# Patient Record
Sex: MALE | Race: WHITE
[De-identification: names, ages, dates, MRNs, and addresses within clinical notes are randomized per-mention and may not be internally consistent; named-entity substitution may affect disease eponyms.]

---

## 2018-03-08 ENCOUNTER — HOSPITAL ENCOUNTER (OUTPATIENT)
Dept: HOSPITAL 96 - M.CL | Age: 63
Discharge: HOME | End: 2018-03-08
Attending: INTERNAL MEDICINE
Payer: MEDICARE

## 2018-03-08 VITALS — SYSTOLIC BLOOD PRESSURE: 141 MMHG | DIASTOLIC BLOOD PRESSURE: 84 MMHG

## 2018-03-08 VITALS — BODY MASS INDEX: 27.57 KG/M2 | HEIGHT: 73 IN | WEIGHT: 208 LBS

## 2018-03-08 VITALS — DIASTOLIC BLOOD PRESSURE: 83 MMHG | SYSTOLIC BLOOD PRESSURE: 122 MMHG

## 2018-03-08 VITALS — DIASTOLIC BLOOD PRESSURE: 76 MMHG | SYSTOLIC BLOOD PRESSURE: 123 MMHG

## 2018-03-08 VITALS — DIASTOLIC BLOOD PRESSURE: 76 MMHG | SYSTOLIC BLOOD PRESSURE: 132 MMHG

## 2018-03-08 DIAGNOSIS — Z95.5: ICD-10-CM

## 2018-03-08 DIAGNOSIS — E11.9: ICD-10-CM

## 2018-03-08 DIAGNOSIS — I50.22: ICD-10-CM

## 2018-03-08 DIAGNOSIS — Z79.899: ICD-10-CM

## 2018-03-08 DIAGNOSIS — E78.5: ICD-10-CM

## 2018-03-08 DIAGNOSIS — Z98.890: ICD-10-CM

## 2018-03-08 DIAGNOSIS — Z45.02: Primary | ICD-10-CM

## 2018-03-08 DIAGNOSIS — Z79.4: ICD-10-CM

## 2018-03-08 DIAGNOSIS — G47.33: ICD-10-CM

## 2018-03-08 DIAGNOSIS — Z79.01: ICD-10-CM

## 2018-03-08 DIAGNOSIS — I11.0: ICD-10-CM

## 2018-03-08 DIAGNOSIS — Z95.810: ICD-10-CM

## 2018-03-08 DIAGNOSIS — Z88.8: ICD-10-CM

## 2018-03-08 DIAGNOSIS — I25.5: ICD-10-CM

## 2018-03-08 LAB
ABSOLUTE BASOPHILS: 0.1 THOU/UL (ref 0–0.2)
ABSOLUTE EOSINOPHILS: 0.3 THOU/UL (ref 0–0.7)
ABSOLUTE MONOCYTES: 0.5 THOU/UL (ref 0–1.2)
ANION GAP SERPL CALC-SCNC: 9 MMOL/L (ref 7–16)
APTT BLD: 27.5 SECONDS (ref 25–31.3)
BASOPHILS NFR BLD AUTO: 1.1 %
BUN SERPL-MCNC: 25 MG/DL (ref 7–18)
CALCIUM SERPL-MCNC: 9.2 MG/DL (ref 8.5–10.1)
CHLORIDE SERPL-SCNC: 102 MMOL/L (ref 98–107)
CO2 SERPL-SCNC: 27 MMOL/L (ref 21–32)
CREAT SERPL-MCNC: 0.9 MG/DL (ref 0.6–1.3)
EOSINOPHIL NFR BLD: 2.8 %
GLUCOSE SERPL-MCNC: 189 MG/DL (ref 70–99)
GRANULOCYTES NFR BLD MANUAL: 72.7 %
HCT VFR BLD CALC: 40.4 % (ref 42–52)
HGB BLD-MCNC: 13.6 GM/DL (ref 14–18)
INR PPP: 1.2
LYMPHOCYTES # BLD: 1.8 THOU/UL (ref 0.8–5.3)
LYMPHOCYTES NFR BLD AUTO: 18.1 %
MCH RBC QN AUTO: 28.9 PG (ref 26–34)
MCHC RBC AUTO-ENTMCNC: 33.8 G/DL (ref 28–37)
MCV RBC: 85.5 FL (ref 80–100)
MONOCYTES NFR BLD: 5.3 %
MPV: 7.1 FL. (ref 7.2–11.1)
NEUTROPHILS # BLD: 7.4 THOU/UL (ref 1.6–8.1)
NUCLEATED RBCS: 0 /100WBC
PLATELET COUNT*: 190 THOU/UL (ref 150–400)
POTASSIUM SERPL-SCNC: 4.5 MMOL/L (ref 3.5–5.1)
PROTHROMBIN TIME: 11.4 SECONDS (ref 9.2–11.5)
RBC # BLD AUTO: 4.72 MIL/UL (ref 4.5–6)
RDW-CV: 14.5 % (ref 10.5–14.5)
SODIUM SERPL-SCNC: 138 MMOL/L (ref 136–145)
WBC # BLD AUTO: 10.2 THOU/UL (ref 4–11)

## 2018-03-08 NOTE — EKG
[place] [address]
Phone:  [phone]                     ELECTROCARDIOGRAM REPORT      
_______________________________________________________________________________
 
Name:       JOSEPH RAI                  Room:                      REG CLI 
M.R.#:  [id number]      Account #:      [id number]  
Admission:  18     Attend Phys:    Gonzalez Borrero MD
Discharge:               Date of Birth:  [birth year]  
         Report #: [id number]
    [id number]
_______________________________________________________________________________
THIS REPORT FOR:  //name//                      
 
                          [facility]
                                       
Test Date:    2018               Test Time:    10:30:59
Pat Name:     JOSEPH RAI            Department:   
Patient ID:   [id number]            Room:          
Gender:       M                        Technician:   
:          1955               Requested By: Gonzalez Brorero
Order Number: [id number]    Reading MD:   Gonzalez Borrero
                                 Measurements
Intervals                              Axis          
Rate:         87                       P:            32
LA:           148                      QRS:          202
QRSD:         120                      T:            52
QT:           393                                    
QTc:          473                                    
                           Interpretive Statements
Atrial-sensed ventricular-paced rhythm
No further analysis attempted due to paced rhythm
No previous ECG available for comparison
 
Electronically Signed On 3-8-2018 15:39:10 CST by Gonzalez Borrero
[email or web address]
 
 
 
 
 
 
 
 
 
 
 
 
 
 
 
 
 
 
 
  <ELECTRONICALLY SIGNED>
                                           By: Gonzalez Borrero MD, [facility]   
  18     1539
D: 18 1030   _____________________________________
T: 18 1030   Gonzalez Borrero MD, FACC     /EPI

## 2018-04-05 NOTE — CARD
[address]  [address]                    CARDIAC CATH REPORT           
_______________________________________________________________________________
 
Name:       JOSEPH RAI                  Room:                      REG SHERRELL 
NICOL#:  [id number]      Account #:      [id number]  
Admission:  03/08/18     Attend Phys:    Gonzalez Borrero MD
Discharge:               Date of Birth:  [birth year]  
         Report #: [id number]
                                                                     [id number]
_______________________________________________________________________________
THIS REPORT FOR:  //name//                      
 
 
--------------- APPROVED REPORT --------------
 
 
Study performed:  03/08/2018 12:00:31
 
Patient Status: Out-Patient       Room #: 
Exam: biventricular pacing ICD generator replacement
Indications: biventricular ICD generator at elective replacement
 
The patient is a 62 year-old male with a history of ischemic 
cardiomyopathy status post biventricular ICD placement.
 
Implanted Devices:  Biotronik Itevia 7 HFT DF1 model #114401, 
serial #[id number]
 
Explanted Devices:  Medtronic Protecta XT CRT-D model number D3-1-4 
DRG serial number [id number]
 
Procedure
After informed consent was obtained the patient was brought to the 
cardiac catheterization lab. The area of the left chest was prepped 
and draped in sterile fashion. Local anesthesia was achieved with 1% 
lidocaine. Next after an initial incision was made the existing pulse 
generator was explanted using electrocautery and blunt dissection. 
The generator was removed from the RV LV and atrial leads. The leads 
were checked for adequate sensing and thresholds. The device pocket 
was flushed with antibiotic solution. Next the new biventricular ICD 
pulse generator was attached to the leads. The pulse generator and 
redundant leads were then replaced within the generator pocket. The 
deep tissues were closed using interrupted stitches of 2-0 Vicryl. 
The skin incision was then closed with a single subcuticular stitch 
of 4-0 Vicryl. Several Steri-Strips were placed across the incision. 
A sterile Telfa pad was then covered with a Tegaderm. The patient 
tolerated procedure without complication.
 
Findings
The sensed R-wave was 6.60 mV. The sensed P-wave was 2.0 mV. The 
sensed LV wave was 24.4 mV. The right ventricular pacing threshold 
was 0.9 V at 0.40 ms. The right the LV lead pacing threshold was 0.9 
V at 0.40 ms. The RV pacing impedance was 493 ohms. The atrial pacing 
impedance was 391 ohms. The LV pacing impedance was 594 ohms.
 
VF detection rate to 150 beats per minute. VF therapies anti-tach 
 
 
 
[place] [address]                    CARDIAC CATH REPORT           
_______________________________________________________________________________
 
Name:       JOSEPH RAI                  Room:                      REG CLI 
M.R.#:  [id number]      Account #:      [id number]  
Admission:  03/08/18     Attend Phys:    Gonzalez Borrero MD
Discharge:               Date of Birth:  [birth year]  
         Report #: [id number]
                                                                     [id number]
_______________________________________________________________________________
pacing times one followed by 40 J shocks times a day.
Fast VT detection rate 176 bpm. Fast VT therapies burst pacing 3, 
ramp pacing 3, 40 J shock times a day.
Slow VT detection rate 154 bpm. Slow VT therapy monitor 
only.
 
 
 
Conclusion
1. Biventricular ICD generator at elective replacement.
2. Successful replacement of biventricular ICD generator. 
 
Recommendations
1. Follow-up site check in one week.
 
 
 
 
 
 
 
 
 
 
 
 
 
 
 
 
 
 
 
 
 
 
 
 
 
 
 
 
 
 
<ELECTRONICALLY SIGNED>
                                        By:  Gonzalez Borrero MD, FACC   
04/05/18 1810
D: 04/05/18 1810_______________________________________
T: 04/05/18 1810Michaedano Borrero MD, FACC      /INF

## 2019-03-02 ENCOUNTER — HOSPITAL ENCOUNTER (INPATIENT)
Dept: HOSPITAL 96 - M.TBA | Age: 64
LOS: 3 days | Discharge: HOME HEALTH | DRG: 286 | End: 2019-03-05
Attending: INTERNAL MEDICINE | Admitting: INTERNAL MEDICINE
Payer: MEDICARE

## 2019-03-02 VITALS — DIASTOLIC BLOOD PRESSURE: 61 MMHG | SYSTOLIC BLOOD PRESSURE: 114 MMHG

## 2019-03-02 VITALS — WEIGHT: 198 LBS | HEIGHT: 72.99 IN | BODY MASS INDEX: 26.24 KG/M2

## 2019-03-02 VITALS — DIASTOLIC BLOOD PRESSURE: 64 MMHG | SYSTOLIC BLOOD PRESSURE: 117 MMHG

## 2019-03-02 VITALS — DIASTOLIC BLOOD PRESSURE: 46 MMHG | SYSTOLIC BLOOD PRESSURE: 96 MMHG

## 2019-03-02 DIAGNOSIS — I50.33: ICD-10-CM

## 2019-03-02 DIAGNOSIS — Z95.1: ICD-10-CM

## 2019-03-02 DIAGNOSIS — Z88.8: ICD-10-CM

## 2019-03-02 DIAGNOSIS — I25.5: ICD-10-CM

## 2019-03-02 DIAGNOSIS — E83.42: ICD-10-CM

## 2019-03-02 DIAGNOSIS — Z79.4: ICD-10-CM

## 2019-03-02 DIAGNOSIS — Z95.0: ICD-10-CM

## 2019-03-02 DIAGNOSIS — Z95.5: ICD-10-CM

## 2019-03-02 DIAGNOSIS — E78.5: ICD-10-CM

## 2019-03-02 DIAGNOSIS — Z79.01: ICD-10-CM

## 2019-03-02 DIAGNOSIS — I48.2: ICD-10-CM

## 2019-03-02 DIAGNOSIS — Z79.899: ICD-10-CM

## 2019-03-02 DIAGNOSIS — Z82.49: ICD-10-CM

## 2019-03-02 DIAGNOSIS — E11.9: ICD-10-CM

## 2019-03-02 DIAGNOSIS — I25.2: ICD-10-CM

## 2019-03-02 DIAGNOSIS — I11.0: ICD-10-CM

## 2019-03-02 DIAGNOSIS — I25.119: Primary | ICD-10-CM

## 2019-03-02 NOTE — EKG
[place] [address]
Phone:  [phone]                     ELECTROCARDIOGRAM REPORT      
_______________________________________________________________________________
 
Name:       MAR ALLEN              Room:           [address]    [place]  
M.R.#:  [id number]      Account #:      [id number]  
Admission:  19     Attend Phys:    Rubén Linn MD 
Discharge:  19     Date of Birth:  [birth year]  
         Report #: [id number]
    [id number]
_______________________________________________________________________________
THIS REPORT FOR:  //name//                      
 
                          [facility]
                                       
Test Date:    2019               Test Time:    17:33:10
Pat Name:     MAR ALLEN            Department:   
Patient ID:   [id number]            Room:         [address]
Gender:       M                        Technician:   JEF
:          1955               Requested By: Williams Clements
Order Number: [id number]    Fariha MD:     
                                 Measurements
Intervals                              Axis          
Rate:         94                       P:            69
[place]:           139                      QRS:          200
QRSD:         138                      T:            44
QT:           401                                    
QTc:          502                                    
                           Interpretive Statements
Incomplete analysis due to missing data in precordial lead(s)
Atrial-sensed ventricular-paced complexes
No further analysis attempted due to paced rhythm
Missing lead(s): V3
Compared to ECG 2018 10:30:59
No significant changes
[email or web address]
 
 
 
 
 
 
 
 
 
 
 
 
 
 
 
 
 
 
                         
                                           By:                               
                   
D: 19 1733   _____________________________________
T: 19   Epiphany Epiphany, MD           /EPI

## 2019-03-02 NOTE — NUR
PATIENT ADMITTED ON TELEMETRY FLOOR AT 1430 FROM [facility] WITH A
DIAGNOSIS OF CHEST PAIN. REPORT WAS RECEIVED OVER THE PHONE FROM ROHIT AVELAR
FROM [facility]. PT ARRIVED ON A STRETCHER ACCOMPANIED BY WO
NYDIA. ON O2 2 L NC. SATURATION IS 96% ON 2 L NC. VS TAKEN . VSS. SEE
CHART. SR BBB V PACED ON CARDIAC MONITOR. SEE CARDIAC SCRIPT IN CHART. PT MADE
COMFORTABLE IN BED. DENIES CHEST PAIN, SOA, N/V. PT SAYS HE STARTING FEELING
THE CHEST PAIN LAST NIGHT WHILE SITTING ON COUCH WATCHING TV.  PT LIVES WITH
WIFE NOT PRESENT AT THIS TIME. ADMISSION HX AND ASSESSMENT PERFORMED. SEE
CHART. NO WOUND NOTICED.  URINAL PROVIDED, CALL LIGHT AT REACH.  FALL
PRECAUTION IN PLACE. SCD'S PLACED. FALL AGREEEMENT AND INSURANCE PAPERS
SIGNED. EDUCATION PROVIDED, PT STATES UNDERSTANDING. QUESTIONS ANSWERED. DIET
ORDERED. PT NOW LAYING IN BED WATCHING TV. SR BATES CONTACTED , NEW ORDERS
RECEIVED. WILL PROCEED WITH ORDERS. WILL CONTINUE TO MONITOR PT.

## 2019-03-02 NOTE — NUR
PT IV LINE IS LOCATED IN RIGHT FOREARM. LOOKS INTACT. IV WAS INSERTED AT [facility] AT 0300 AM ON 3/2. IV IS KEPT

## 2019-03-03 VITALS — SYSTOLIC BLOOD PRESSURE: 109 MMHG | DIASTOLIC BLOOD PRESSURE: 73 MMHG

## 2019-03-03 VITALS — DIASTOLIC BLOOD PRESSURE: 54 MMHG | SYSTOLIC BLOOD PRESSURE: 123 MMHG

## 2019-03-03 VITALS — DIASTOLIC BLOOD PRESSURE: 74 MMHG | SYSTOLIC BLOOD PRESSURE: 120 MMHG

## 2019-03-03 VITALS — SYSTOLIC BLOOD PRESSURE: 114 MMHG | DIASTOLIC BLOOD PRESSURE: 67 MMHG

## 2019-03-03 VITALS — DIASTOLIC BLOOD PRESSURE: 64 MMHG | SYSTOLIC BLOOD PRESSURE: 110 MMHG

## 2019-03-03 VITALS — DIASTOLIC BLOOD PRESSURE: 64 MMHG | SYSTOLIC BLOOD PRESSURE: 102 MMHG

## 2019-03-03 LAB
ALBUMIN SERPL-MCNC: 3.1 G/DL (ref 3.4–5)
ALP SERPL-CCNC: 42 U/L (ref 46–116)
ALT SERPL-CCNC: 22 U/L (ref 30–65)
ANION GAP SERPL CALC-SCNC: 7 MMOL/L (ref 7–16)
ANION GAP SERPL CALC-SCNC: 8 MMOL/L (ref 7–16)
APTT BLD: 43.5 SECONDS (ref 25–31.3)
AST SERPL-CCNC: 15 U/L (ref 15–37)
BILIRUB SERPL-MCNC: 0.4 MG/DL
BUN SERPL-MCNC: 23 MG/DL (ref 7–18)
BUN SERPL-MCNC: 24 MG/DL (ref 7–18)
CALCIUM SERPL-MCNC: 9.6 MG/DL (ref 8.5–10.1)
CALCIUM SERPL-MCNC: 9.8 MG/DL (ref 8.5–10.1)
CHLORIDE SERPL-SCNC: 96 MMOL/L (ref 98–107)
CHLORIDE SERPL-SCNC: 98 MMOL/L (ref 98–107)
CHOLEST SERPL-MCNC: 218 MG/DL (ref ?–200)
CO2 SERPL-SCNC: 30 MMOL/L (ref 21–32)
CO2 SERPL-SCNC: 31 MMOL/L (ref 21–32)
CREAT SERPL-MCNC: 0.9 MG/DL (ref 0.6–1.3)
CREAT SERPL-MCNC: 1.3 MG/DL (ref 0.6–1.3)
GLUCOSE SERPL-MCNC: 178 MG/DL (ref 70–99)
GLUCOSE SERPL-MCNC: 97 MG/DL (ref 70–99)
HCT VFR BLD CALC: 35.5 % (ref 42–52)
HDLC SERPL-MCNC: 45 MG/DL (ref 40–?)
HGB BLD-MCNC: 11.8 GM/DL (ref 14–18)
INR PPP: 2.2
LDLC SERPL-MCNC: 137 MG/DL (ref ?–100)
MAGNESIUM SERPL-MCNC: 1.6 MG/DL (ref 1.8–2.4)
MCH RBC QN AUTO: 28.1 PG (ref 26–34)
MCHC RBC AUTO-ENTMCNC: 33.3 G/DL (ref 28–37)
MCV RBC: 84.2 FL (ref 80–100)
MPV: 7.6 FL. (ref 7.2–11.1)
PLATELET COUNT*: 193 THOU/UL (ref 150–400)
POTASSIUM SERPL-SCNC: 3.6 MMOL/L (ref 3.5–5.1)
POTASSIUM SERPL-SCNC: 4 MMOL/L (ref 3.5–5.1)
PROT SERPL-MCNC: 6.8 G/DL (ref 6.4–8.2)
PROTHROMBIN TIME: 22.1 SECONDS (ref 9.2–11.5)
RBC # BLD AUTO: 4.22 MIL/UL (ref 4.5–6)
RDW-CV: 14.3 % (ref 10.5–14.5)
SODIUM SERPL-SCNC: 135 MMOL/L (ref 136–145)
SODIUM SERPL-SCNC: 135 MMOL/L (ref 136–145)
TC:HDL: 4.8 RATIO
TRIGL SERPL-MCNC: 182 MG/DL (ref ?–150)
VLDLC SERPL CALC-MCNC: 36 MG/DL (ref ?–40)
WBC # BLD AUTO: 9.4 THOU/UL (ref 4–11)

## 2019-03-03 NOTE — NUR
assumed pt care report received from nurse pt is aox4 sr on cardiac monitor.
on 1/2 l nc saturation is 95% and above. no complaint of chest pain. pt took a
shower today and out to bed with walker. on fall precaution. pt vss. insulin
given as needed. fentanyl patch removed form right shoulder. nitro patch
removed as well since pt has been wearing them for so long. cardiologist saw
pt and ordered cath tomorrow. nurse changed iv to left forearm. consent for
cath signed as ordered. pt remains without any chest pain for the day. pt
complains that he wants fentanyl patch to be restarted as he usually takes it
home. dr martinez notified. will await for call back. pt is stable and no sob
noticed. will continue to monitor

## 2019-03-03 NOTE — NUR
PATIENT HAS REMAINED ALERT AND ORIENTED X 4 THROUGHOUT THE SHIFT AND RESTING
WELL ON HOURLY ROUNDS. HAS DENIED CHEST PAIN OR SHORTNESS OF AIR. VOIDS PER
URINAL. MELANIA FOR DECREASED DOSE COMPARED TO HOME DOSE OF LONG ACTING INSULIN AT
HS PER ORDER. VITAL SIGNS STABLE ON 2L/MIN O2 BY NASAL CANNULA OVERNIGHT. NPO
AT MIDNIGHT FOR CARDIOLOGY CONSULT THIS AM. CONTINUE TO MONITOR.

## 2019-03-03 NOTE — EKG
[place] [address]
Phone:  [phone]                     ELECTROCARDIOGRAM REPORT      
_______________________________________________________________________________
 
Name:       MAR ALLEN              Room:           [address]    [place]  
M.R.#:  [id number]      Account #:      [id number]  
Admission:  19     Attend Phys:    Rubén Linn MD 
Discharge:  19     Date of Birth:  [birth year]  
         Report #: [id number]
    [id number]
_______________________________________________________________________________
THIS REPORT FOR:  //name//                      
 
                          [facility]
                                       
Test Date:    2019               Test Time:    17:35:18
Pat Name:     MAR ALLEN            Department:   
Patient ID:   [id number]            Room:         [address]
Gender:       M                        Technician:   JEF
:          1955               Requested By: Williams Clements
Order Number: [id number]    Fariha MD:   Luan Byrnes
                                 Measurements
Intervals                              Axis          
Rate:         94                       P:            53
NV:           135                      QRS:          182
QRSD:         152                      T:            47
QT:           406                                    
QTc:          508                                    
                           Interpretive Statements
Atrial-sensed ventricular-paced complexes
No further analysis attempted due to paced rhythm
Baseline wander in lead(s) V1,V3
Compared to ECG 2018 10:30:59
No significant changes
 
Electronically Signed On 3-3-2019 14:09:24 CST by Luan Byrnes
[email or web address]
 
 
 
 
 
 
 
 
 
 
 
 
 
 
 
 
 
  <ELECTRONICALLY SIGNED>
                                           By: Luan Byrnes MD, FACC   
  19     1409
D: 19 1735   _____________________________________
T: 19 1735   Luan Byrnes MD, FACC     /EPI

## 2019-03-04 VITALS — DIASTOLIC BLOOD PRESSURE: 63 MMHG | SYSTOLIC BLOOD PRESSURE: 113 MMHG

## 2019-03-04 VITALS — SYSTOLIC BLOOD PRESSURE: 113 MMHG | DIASTOLIC BLOOD PRESSURE: 66 MMHG

## 2019-03-04 VITALS — SYSTOLIC BLOOD PRESSURE: 103 MMHG | DIASTOLIC BLOOD PRESSURE: 50 MMHG

## 2019-03-04 VITALS — DIASTOLIC BLOOD PRESSURE: 59 MMHG | SYSTOLIC BLOOD PRESSURE: 123 MMHG

## 2019-03-04 VITALS — SYSTOLIC BLOOD PRESSURE: 113 MMHG | DIASTOLIC BLOOD PRESSURE: 58 MMHG

## 2019-03-04 VITALS — SYSTOLIC BLOOD PRESSURE: 100 MMHG | DIASTOLIC BLOOD PRESSURE: 55 MMHG

## 2019-03-04 VITALS — SYSTOLIC BLOOD PRESSURE: 127 MMHG | DIASTOLIC BLOOD PRESSURE: 79 MMHG

## 2019-03-04 VITALS — SYSTOLIC BLOOD PRESSURE: 110 MMHG | DIASTOLIC BLOOD PRESSURE: 63 MMHG

## 2019-03-04 VITALS — DIASTOLIC BLOOD PRESSURE: 61 MMHG | SYSTOLIC BLOOD PRESSURE: 107 MMHG

## 2019-03-04 VITALS — SYSTOLIC BLOOD PRESSURE: 108 MMHG | DIASTOLIC BLOOD PRESSURE: 61 MMHG

## 2019-03-04 VITALS — SYSTOLIC BLOOD PRESSURE: 118 MMHG | DIASTOLIC BLOOD PRESSURE: 63 MMHG

## 2019-03-04 VITALS — SYSTOLIC BLOOD PRESSURE: 121 MMHG | DIASTOLIC BLOOD PRESSURE: 61 MMHG

## 2019-03-04 VITALS — DIASTOLIC BLOOD PRESSURE: 64 MMHG | SYSTOLIC BLOOD PRESSURE: 118 MMHG

## 2019-03-04 VITALS — DIASTOLIC BLOOD PRESSURE: 59 MMHG | SYSTOLIC BLOOD PRESSURE: 111 MMHG

## 2019-03-04 LAB
ALBUMIN SERPL-MCNC: 3.5 G/DL (ref 3.4–5)
ALP SERPL-CCNC: 48 U/L (ref 46–116)
ALT SERPL-CCNC: 25 U/L (ref 30–65)
ANION GAP SERPL CALC-SCNC: 6 MMOL/L (ref 7–16)
APTT BLD: 37.3 SECONDS (ref 25–31.3)
AST SERPL-CCNC: 21 U/L (ref 15–37)
BILIRUB SERPL-MCNC: 0.2 MG/DL
BUN SERPL-MCNC: 31 MG/DL (ref 7–18)
CALCIUM SERPL-MCNC: 9.7 MG/DL (ref 8.5–10.1)
CHLORIDE SERPL-SCNC: 99 MMOL/L (ref 98–107)
CO2 SERPL-SCNC: 31 MMOL/L (ref 21–32)
CREAT SERPL-MCNC: 1.1 MG/DL (ref 0.6–1.3)
GLUCOSE SERPL-MCNC: 148 MG/DL (ref 70–99)
HCT VFR BLD CALC: 39.3 % (ref 42–52)
HGB BLD-MCNC: 13.3 GM/DL (ref 14–18)
INR PPP: 1.8
MAGNESIUM SERPL-MCNC: 1.7 MG/DL (ref 1.8–2.4)
MCH RBC QN AUTO: 28.3 PG (ref 26–34)
MCHC RBC AUTO-ENTMCNC: 33.8 G/DL (ref 28–37)
MCV RBC: 83.6 FL (ref 80–100)
MPV: 8 FL. (ref 7.2–11.1)
PLATELET COUNT*: 242 THOU/UL (ref 150–400)
POTASSIUM SERPL-SCNC: 4.6 MMOL/L (ref 3.5–5.1)
PROT SERPL-MCNC: 7.7 G/DL (ref 6.4–8.2)
PROTHROMBIN TIME: 17.9 SECONDS (ref 9.2–11.5)
RBC # BLD AUTO: 4.7 MIL/UL (ref 4.5–6)
RDW-CV: 14.4 % (ref 10.5–14.5)
SODIUM SERPL-SCNC: 136 MMOL/L (ref 136–145)
WBC # BLD AUTO: 10.1 THOU/UL (ref 4–11)

## 2019-03-04 PROCEDURE — B2181ZZ FLUOROSCOPY OF LEFT INTERNAL MAMMARY BYPASS GRAFT USING LOW OSMOLAR CONTRAST: ICD-10-PCS

## 2019-03-04 PROCEDURE — B41J1ZZ FLUOROSCOPY OF OTHER LOWER ARTERIES USING LOW OSMOLAR CONTRAST: ICD-10-PCS

## 2019-03-04 PROCEDURE — B4101ZZ FLUOROSCOPY OF ABDOMINAL AORTA USING LOW OSMOLAR CONTRAST: ICD-10-PCS

## 2019-03-04 PROCEDURE — B2131ZZ FLUOROSCOPY OF MULTIPLE CORONARY ARTERY BYPASS GRAFTS USING LOW OSMOLAR CONTRAST: ICD-10-PCS

## 2019-03-04 PROCEDURE — 4A023N7 MEASUREMENT OF CARDIAC SAMPLING AND PRESSURE, LEFT HEART, PERCUTANEOUS APPROACH: ICD-10-PCS | Performed by: INTERNAL MEDICINE

## 2019-03-04 PROCEDURE — B2111ZZ FLUOROSCOPY OF MULTIPLE CORONARY ARTERIES USING LOW OSMOLAR CONTRAST: ICD-10-PCS | Performed by: INTERNAL MEDICINE

## 2019-03-04 PROCEDURE — 30233L1 TRANSFUSION OF NONAUTOLOGOUS FRESH PLASMA INTO PERIPHERAL VEIN, PERCUTANEOUS APPROACH: ICD-10-PCS

## 2019-03-04 NOTE — NUR
END SHIFT: PT WAS AWAKE MOST OF THE NIGHT. CALM AND COOPERATIVE. UP WITH
WALKER. PT WAS VERY UNSTEADY AT BEG OF SHIFT AND APPEARED TO GET STRONGER AND
MORE STEADY AS THE NIGHT WENT ON AND PT WALKED THE HALLS. AV VS. V PACING ON
MONITOR. PAIN NOTED IN SHOULDERS, FENTYNAL PATCH ORDERED AND APPLIED TO L
SHOULDER FOR COMFORT. PT HAS BEEN NPO SINCE [place] AWAITING CATH PROCEDURE TODAY.
VSS. SAFETY PRECAUTIONS IN PLACE. CALL LIGHT IN REACH. WILL CONT TO MONITOR.

## 2019-03-04 NOTE — CARD
[address]  [address]                    CARDIAC CATH REPORT           
_______________________________________________________________________________
 
Name:       MAR ALLEN FADY              Room:           [address] [place]  
M.R.#:  [id number]      Account #:      [id number]  
Admission:  03/02/19     Attend Phys:    Rubén Linn MD 
Discharge:  03/05/19     Date of Birth:  [birth year]  
         Report #: [id number]
                                                                     [id number]
_______________________________________________________________________________
THIS REPORT FOR:  //name//                      
 
 
--------------- APPROVED REPORT --------------
 
 
Study performed:  03/04/2019 13:10:01
 
Patient Details
Patient Status: In-Patient                  Room #: 206
The patient is a 63 year-old male
 
Event Personnel
Gibson Downey  Interventional Cardiologist, Anahi Serrano RN Monitor, 
Paola Tovar  Circulator, Angel Parkinson  Scrub
 
Procedures Performed
Art Access - R femoral artery*  , Left Heart Catheterization 
selective coronary arteriography saphenous vein graft study, LIMA 
graft study and ascending aortography
 
Indication
Non-STEMI 
 
Risk Factors
Hypercholesterolemia, Hypertension
 
Previous Procedures/Diagnoses
Previous CABGPrevious PCI, Previous MI
 
Procedure Narrative
The patient was brought electively to the Cardiac Catheterization 
Laboratory and was prepped and draped in a sterile manner. The right 
femoral was infiltrated with 2% Lidocaine subcutaneous anesthesia. A 
6 Gambian sheath was inserted into the right femoral artery. Coronary 
angiography was performed using coronary diagnostic catheters. The 
right coronary system was accessed and visualized with a AR1 Mod 6fr 
catheter. The left coronary system was accessed and visualized with a 
6Fr JL4 catheter. The left ventricle was accessed and visualized with 
a 6Fr. Pig catheter. Left ventricular/Aortic Valve gradient assessed 
via catheter pullback. An aortogram of the ascending aorta was 
performed. Pre-demployment femoral angiogram was performed . Closure 
device was deployed with a 6 Fr Mynx. The patient tolerated the 
procedure well and there were no complications associated with the 
procedure. There was no hematoma.
 
 
 
 
[place] [address]                    CARDIAC CATH REPORT           
_______________________________________________________________________________
 
Name:       MAR ALLEN              Room:           [address] [place]  
[facility]#:  [id number]      Account #:      [id number]  
Admission:  03/02/19     Attend Phys:    Rubén Linn MD 
Discharge:  03/05/19     Date of Birth:  [birth year]  
         Report #: [id number]
                                                                     [id number]
_______________________________________________________________________________
Dose:   1436 mGy  
 
Native Artery Percent Stenosis
#1 widely patent LIMA graft to the LAD with 40 and 50% distal LAD 
stenoses
 
#2 widely patent saphenous vein graft to the distal circumflex with 
20% proximal graft narrowing
 
#3 patent saphenous vein graft to the posterior descending branch of 
the right coronary artery with 40% proximal graft 
narrowing
 
Diagnostic Cath
Left Main 0% narrowing
LAD  100% proximal occlusion
Circumflex 40 Percent mid vessel narrowing and 100% distal 
occlusion
Right Coronary 40 Percent proximal and 75% narrowing at the acute 
margin with total occlusion of the posterior descending branch 
reciprocal flow reflected in that branch
 
Left Ventriculography
Left Ventriculography was not performed.      Ascending aortography 
demonstrated no significant dilatation of the aortic root no aortic 
insufficiency and a widely patent vein graft the right coronary 
artery
 
Hemodynamics
The aortic pressure is 118/55 mmHg with a mean of 77 mmHg. The left 
ventricular pressure is 117/4 mmHg with a mean of mmHg. The left 
ventricular end diastolic pressure is 22 mmHg. 
 
Conclusion
#1 significant coronary artery disease characterized by the 
following:
 
A 100% proximal LAD occlusion
 
B  40% mid circumflex stenoses with 100% distal occlusion
 
C 40% proximal right coronary narrowing with 75% narrowing at the 
acute margin and a totally occluded posterior descending branch with 
reciprocal flow reflecting a patent vein graft to the branch
 
#2 widely patent LIMA graft to the LAD with 40 and 50% distal LAD 
 
 
 
[place] [address]                    CARDIAC CATH REPORT           
_______________________________________________________________________________
 
Name:       MAR ALLEN              Room:           [address] [place]  
.R.#:  [id number]      Account #:      [id number]  
Admission:  03/02/19     Attend Phys:    Rubén Linn MD 
Discharge:  03/05/19     Date of Birth:  [birth year]  
         Report #: [id number]
                                                                     [id number]
_______________________________________________________________________________
narrowings
 
#3 widely patent vein graft to the circumflex with 20% proximal graft 
narrowing
 
#4 widely patent vein graft to the posterior descending branch of the 
right coronary artery with 40% proximal graft narrowing
 
#5 normal left-sided hemodynamics study
 
#6 ascending aortography demonstrated no significant dilatation of 
the ascending aorta no aortic insufficiency and a patent vein graft 
to the right coronary artery 
 
Recommendations
Cardiac Risk Reduction Program
Aggressive Medical Therapy
 
Diagnostic Cath Approved by: Gibson Downey MD        Date/Time: 
03/04/2019 16:41:53
 
 
 
 
 
 
 
 
 
 
 
 
 
 
 
 
 
 
 
 
 
 
 
 
<ELECTRONICALLY SIGNED>
                                        By:  Gibson Downey MD, [facility]     
03/04/19     1644
D: 03/04/19 1644_______________________________________
T: 03/04/19 1644Gibson Downey MD, [facility]        /INF

## 2019-03-04 NOTE — NUR
ASSUMED CARE OF PT THIS AM AROUND 0715- CARDIAC MONITOR IN PLACE AS ORDERED,
TRACING V-PACED WITH NOTED PACE MAKER- UPON ASSESSMENT PT NOTED TO BE RESTING
IN BED, WATCHING TV- PT A&O X4- CONTINENT OF BOWEL AND BLADDER- SBA WITH RW
FOR SAFETY- LCTA, RESP EVEN AND UN-LABORED- VSS, O2 SAT 93% ON RA- ABD
SOFT/ROUND/NON-TENDER, BS X 4 QUADS- PT REPORTS LAST BM THIS AM- IV NOTED TO
LEFT FA INTACT, IVF INFUSSING AS PRESCRIBED- PT CURRENLTY NPO FOR PLANNED
CARDIAC CATH THIS AM- BS MONITORED AS ORDERED, INSULIN HELD THIS AM R/T NPO
STATUS- CALL LIGHT AND PERSONAL BELONGINGS WITH IN REACH- HOURLY ROUNDS IN
PLACE R/T SAFETY/NEEDS- ALL NEEDS MET AT THIS TIME-WCTM

## 2019-03-04 NOTE — NUR
PT CURRENTLY RESTING IN BED, WATCHING TV- CARDIAC MONITOR IN PLACE AS ORDERED,
TRACING AV PACED- IV TO LEFT FA NOTED TO INFILTRATED WITH NEW 20 GAUGE PALCED
TO RIGHT AC- CARDIAC CATH PLACED ON HOLD THIS AM R/T ELEVATED INR/PT WITH
COUMADIN GIVEN THIS AM AS PRESCRIBED- ORDERS RECIEVIED FOR 2 UNITS OF FFP TO
PASTORA GIVEN WITH INR/PT TO BE REDRAW WITH POSSIBLE CATH THIS AFTERNOON PER
- 2 UNITS OF FFP GIVEN AS PRESCRIBED WITH INR/APPT DRAWN INBETWEEN
1ST AND SECOND BAG WITH RESULTS COMMUNICATED TO CATH LAB AND OKAY RECIEVIED
FOR CATH TO BE COMPLETED AS INDICATED- PT LEFT UNIT FOR CATH AROUND 1330 AND
RETURNED TO UNIT AT 1515- RIGHT GROIN ACCESSED WITH CLOSURE REPORTED WITH
MYNX- RIGHT GROIN DRESSING C/D/I WITH NO HEMATOMA NOTED- VS IN PLACE PER
 POST CATH ORDERS- MG IV COMPLETED ONCE RETURNED TO UNIT- IVF
INFUSSING AS PRESCIBED AT 70ML/HR- IMMOBILIZATION OF RIGHT LE INSTRUCTED- BED
REST TO REMAINI N PLACE TILL 2000- PT REPORTS SENSATION INTACT, CAP REFIL < 3-
CALL LIGHT AND PERSONAL BELONGINGS WITH IN REACH- HOURLY ROUNDS IN PLACE R/T
SAFETY/NEEDS- ALL NEEDS MET AT THIS TIME-WCTM

## 2019-03-04 NOTE — 2DMMODE
[place] [address]
Phone:  [phone] 2 D/M-MODE ECHOCARDIOGRAM     
_______________________________________________________________________________
 
Name:         MAR ALLEN             Room:          [address] [place] 
.R.#:    [id number]     Account #:     [id number]  
Admission:    19    Attend Phys:   Rubén Linn, 
Discharge:    19    Date of Birth: [birth year]  
Date of Service: 19 1327  Report #:      [id number]
        [id number]
_______________________________________________________________________________
THIS REPORT FOR:  //name//                      
 
 
--------------- APPROVED REPORT --------------
 
 
Study performed:  2019 11:08:16
 
EXAM: Comprehensive 2D, Doppler, and color-flow 
Echocardiogram 
Patient Location: Bedside   
 
      BSA:         2.12
HR: 67 bpm BP:          113/66 mmHg 
 
Other Information 
Study Quality: Good
 
Indications
Pacemaker
CAD
Chest Pain
 
2D Dimensions
IVSd:  12.09 (7-11mm) LVOT Diam:  21.68 (18-24mm) 
LVDd:  57.26 mm  
PWd:  11.75 (7-11mm) Ascending Ao:  33.76 (22-36mm)
LVDs:  48.91 (25-40mm) 
Aortic Root:  32.93 mm 
 
Volumes
Left Atrial Volume (Systole) 
    LA ESV Index:  26.60 mL/m2
 
Aortic Valve
AoV Peak Mevlin.:  0.98 m/s 
AO Peak Gr.:  3.82 mmHg  LVOT Max P.18 mmHg
AO Mean Gr.:  2.63 mmHg  LVOT Mean P.99 mmHg
    LVOT Max V:  1.02 m/s
AO V2 VTI:  17.58 cm  LVOT Mean V:  0.64 m/s
ADRIÁN (VTI):  4.32 cm2  LVOT V1 VTI:  20.56 cm
 
Mitral Valve
    E/A Ratio:  1.07
    MV Decel. Time:  215.48 ms
MV E Max Melvin.:  0.65 m/s 
 
 
[place] [address]
Phone:  [phone]                     2 D/M-MODE ECHOCARDIOGRAM     
_______________________________________________________________________________
 
Name:         MAR ALLEN             Room:          [address] [place] 
..#:    [id number]     Account #:     [id number]  
Admission:    19    Attend Phys:   Rubén Linn, 
Discharge:    19    Date of Birth: [birth year]  
Date of Service: 19 1327  Report #:      [id number]
        [id number]
_______________________________________________________________________________
MV PHT:  62.49 ms  
MVA (PHT):  3.52 cm2  
 
TDI
E/Lateral E':  13.00 E/Medial E':  7.22
   Medial E' Melvin.:  0.09 m/s
   Lateral E' Melvin.:  0.05 m/s
 
Pulmonary Valve
PV Peak Melvin.:  0.89 m/s PV Peak Gr.:  3.17 mmHg
 
Tricuspid Valve
    RAP Estimate:  5.00 mmHg
TR Peak Gr.:  17.88 mmHg RVSP:  22.88 mmHg
    PA Pressure:  22.88 mmHg
 
Left Ventricle
Left ventricle is moderately dilated. There is distal septal anterior 
and apical akinesis. There is normal left ventricular wall thickness. 
Left ventricular ejection fraction is severely decreased. LVEF is 
25%. Grade I - abnormal relaxation pattern.
 
Right Ventricle
The right ventricle is normal size. The right ventricular systolic 
function is normal.
 
Atria
The left atrium size is normal. The right atrium size is 
normal.
 
Aortic Valve
Mild aortic valve sclerosis. No aortic regurgitation is present. 
There is no aortic valvular stenosis.
 
Mitral Valve
The mitral valve is normal in structure. Mild mitral regurgitation. 
No evidence of mitral valve stenosis.
 
Tricuspid Valve
The tricuspid valve is normal in structure. Trace tricuspid 
regurgitation.
 
Pulmonic Valve
The pulmonary valve is normal in structure. There is no pulmonic 
valvular regurgitation.
 
 
 
[place] [address]
Phone:  [phone] 2 D/M-MODE ECHOCARDIOGRAM     
_______________________________________________________________________________
 
Name:         MAR ALLEN             Room:          [address]#:    [id number]     Account #:     [id number]  
Admission:    19    Attend Phys:   Rubén Linn, 
Discharge:    19    Date of Birth: [birth year]  
Date of Service: 19 1327  Report #:      [id number]
        [id number]
_______________________________________________________________________________
Great Vessels
The aortic root is normal in size. IVC is normal in size and 
collapses >50% with inspiration.
 
Pericardium
There is no pericardial effusion.
 
<Conclusion>
Left ventricle is moderately dilated.
There is normal left ventricular wall thickness.
Left ventricular ejection fraction is severely decreased.
LVEF is 25%.
The right ventricle is normal size.
The right ventricular systolic function is normal.
The left atrium size is normal.
Mild aortic valve sclerosis.
No aortic regurgitation is present.
There is no aortic valvular stenosis.
The mitral valve is normal in structure.
Mild mitral regurgitation.
The tricuspid valve is normal in structure.
IVC is normal in size and collapses >50% with inspiration.
There is no pericardial effusion.
There is distal septal anterior and apical akinesis.
 
 
 
 
 
 
 
 
 
 
 
 
 
 
 
 
 
 
 
 
  <ELECTRONICALLY SIGNED>
                                           By: Gibson Downey MD, FACC     
  19     1327
D: 19 1327   _____________________________________
T: 19 1327   Gibson Downey MD, FACC       /INF

## 2019-03-05 VITALS — SYSTOLIC BLOOD PRESSURE: 113 MMHG | DIASTOLIC BLOOD PRESSURE: 58 MMHG

## 2019-03-05 VITALS — SYSTOLIC BLOOD PRESSURE: 115 MMHG | DIASTOLIC BLOOD PRESSURE: 62 MMHG

## 2019-03-05 VITALS — DIASTOLIC BLOOD PRESSURE: 66 MMHG | SYSTOLIC BLOOD PRESSURE: 115 MMHG

## 2019-03-05 VITALS — SYSTOLIC BLOOD PRESSURE: 111 MMHG | DIASTOLIC BLOOD PRESSURE: 65 MMHG

## 2019-03-05 VITALS — DIASTOLIC BLOOD PRESSURE: 67 MMHG | SYSTOLIC BLOOD PRESSURE: 121 MMHG

## 2019-03-05 LAB
ABSOLUTE BASOPHILS: 0.1 THOU/UL (ref 0–0.2)
ABSOLUTE EOSINOPHILS: 0.4 THOU/UL (ref 0–0.7)
ABSOLUTE MONOCYTES: 0.6 THOU/UL (ref 0–1.2)
ANION GAP SERPL CALC-SCNC: 8 MMOL/L (ref 7–16)
BASOPHILS NFR BLD AUTO: 0.6 %
BUN SERPL-MCNC: 29 MG/DL (ref 7–18)
CALCIUM SERPL-MCNC: 9.5 MG/DL (ref 8.5–10.1)
CHLORIDE SERPL-SCNC: 100 MMOL/L (ref 98–107)
CO2 SERPL-SCNC: 28 MMOL/L (ref 21–32)
CREAT SERPL-MCNC: 1 MG/DL (ref 0.6–1.3)
EOSINOPHIL NFR BLD: 4.4 %
GLUCOSE SERPL-MCNC: 202 MG/DL (ref 70–99)
GRANULOCYTES NFR BLD MANUAL: 68 %
HCT VFR BLD CALC: 36.5 % (ref 42–52)
HGB BLD-MCNC: 12.5 GM/DL (ref 14–18)
INR PPP: 1.9
LYMPHOCYTES # BLD: 1.7 THOU/UL (ref 0.8–5.3)
LYMPHOCYTES NFR BLD AUTO: 19.6 %
MAGNESIUM SERPL-MCNC: 1.7 MG/DL (ref 1.8–2.4)
MCH RBC QN AUTO: 28.5 PG (ref 26–34)
MCHC RBC AUTO-ENTMCNC: 34.3 G/DL (ref 28–37)
MCV RBC: 82.9 FL (ref 80–100)
MONOCYTES NFR BLD: 7.4 %
MPV: 7.8 FL. (ref 7.2–11.1)
NEUTROPHILS # BLD: 5.8 THOU/UL (ref 1.6–8.1)
NUCLEATED RBCS: 0 /100WBC
PLATELET COUNT*: 223 THOU/UL (ref 150–400)
POTASSIUM SERPL-SCNC: 4.6 MMOL/L (ref 3.5–5.1)
PROTHROMBIN TIME: 19.9 SECONDS (ref 9.2–11.5)
RBC # BLD AUTO: 4.4 MIL/UL (ref 4.5–6)
RDW-CV: 14.1 % (ref 10.5–14.5)
SODIUM SERPL-SCNC: 136 MMOL/L (ref 136–145)
WBC # BLD AUTO: 8.6 THOU/UL (ref 4–11)

## 2019-03-05 NOTE — NUR
PT HAS RESTED COMFORTABLY THIS SHIFT. DRESSING TO RIGHT GROIN C/D/I. PT HAS
BEEN AMBULATING WITHOUT DIFFICULTY. DENIES PAIN, N/V/D. VSS. REMAINS PACED ON
TELEMETRY. CLWR.

## 2019-03-05 NOTE — NUR
ASSUMED PT CARE AT 0700 PT IS ALERT AND ORIENTED X 4 PT DENIES PAIN OR SOA ON
RA, PT IS UP AD MARYBEL PT IS NOT A FALL RISK, PT IS AV PACED ON THE MONITOR, PT
IS PROGRESSING TOWARDS GOALS, WILL CONTINUE TO MONITOR

## 2019-03-06 NOTE — NUR
Attempted to make a follow up phone call with the patient. No answer left
message with return phone #.

## 2019-03-07 NOTE — NUR
Spoke wit the patient by phone, he is doing well, no problems with chest pain.
His right groin, cath puncture site is healing well, no signs of infection. He
has all his medications. He would like to be called back about outpatient
cardiac rehab after 3/18/19. He needs to talk with his orthopedic surgoen
about a release to exercise after rotator cuff surgey 2 months ago.

## 2019-03-28 NOTE — CON
[address]  [address]                    CONSULTATION                  
_______________________________________________________________________________
 
Name:       MAR ALLEN              Room:           [address] [place]  
M.R.#:  [id number]      Account #:      [id number]  
Admission:  03/02/19     Attend Phys:    Rubén Linn MD 
Discharge:  03/05/19     Date of Birth:  [birth year]  
         Report #: [id number]
                                                                     [id number]  
_______________________________________________________________________________
THIS REPORT FOR:  //name//                      
 
CC: Griffin Linn
 
DATE OF SERVICE:  03/03/2019
 
 
CHIEF COMPLAINT:  Chest pain.
 
HISTORY OF PRESENT ILLNESS:  The patient is a 63-year-old man with ischemic
cardiomyopathy who had an episode of his typical angina while lying down and
watching TV yesterday afternoon.  It did not receive resolved with rest and deep
breaths and it lasted about 15-20 minutes.  He went to the Emergency Room at
[facility], was given 2 nitroglycerin and his symptoms mostly improved.
 His ECG demonstrated the underlying paced rhythm.  He has a history of an ICD.
 
Over the last several days, he has been more short of breath, but denies chest
pain or pressure.  These symptoms are exactly like when he had to have stents
placed about 10 years ago.
 
He has no associated symptoms of palpitations, diaphoresis.  He denies
defibrillator shocks.  He has not been gaining weight.
 
He has been compliant with his home medications.
 
PAST MEDICAL HISTORY:  Includes initial MI in the 90s, subsequent MI in the
2000s and required 3-vessel bypass at [facility] and then he became
a patient of Dr. Costa.  He required initial Medtronic ICD following that
revascularization and subsequent to this, has had a Biotronik implanted here at
[facility].
 
Other medical problems include diabetes mellitus.  He is a former smoker.
 
SOCIAL HISTORY:  No active smoking or drinking.
 
ALLERGIES:  HE HAS ALLERGIES TO ATORVASTATIN, ROSUVASTATIN.
 
HOME MEDICATIONS:  Include Coreg 6.25 mg 1-1/2 tablet p.o. b.i.d., Plavix 75 mg
daily, digoxin 0.125 mg daily, Lasix 20 mg daily, insulin glargine, lisinopril 5
mg daily, metformin 1 g b.i.d., simvastatin 40 mg daily, warfarin 10 mg daily
for atrial fibrillation.
 
REVIEW OF SYSTEMS:
GENERAL:  No fevers or chills.
GASTROINTESTINAL:  No hematemesis or melena.
 
 
 
[place] [address]                    CONSULTATION                  
_______________________________________________________________________________
 
Name:       MAR ALLEN              Room:           [address] [place]  
..#:  [id number]      Account #:      [id number]  
Admission:  03/02/19     Attend Phys:    Rubén Linn MD 
Discharge:  03/05/19     Date of Birth:  [birth year]  
         Report #: [id number]
                                                                     [id number]  
_______________________________________________________________________________
GENITOURINARY:  No dysuria.
HEENT:  No nosebleeds.
CARDIOVASCULAR:  Positive chest pain.  No shortness of breath.  Positive
orthopnea.  He has untreated sleep apnea.
SKIN:  No rashes.
GENERAL:  No fevers or chills.
NEUROLOGIC:  Denies falls.
 
PHYSICAL EXAMINATION:
VITAL SIGNS:  Blood pressure is 122/54.  Pulse 75, paced.  Temperature 36.6,
respiratory rate is 17.
GENERAL:  This is a pleasant elderly male, who is alert, oriented, in no
apparent distress.
EYES:  Sclerae anicteric.
NECK:  Supple.  No jugular venous distention.  Carotid upstrokes are normal.
CARDIOVASCULAR:  Regular, faint S3.
LUNGS:  Clear to auscultation bilaterally.
ABDOMEN:  Soft, nontender.
EXTREMITIES:  No peripheral edema.
SKIN:  Warm and dry.
PSYCHIATRIC:  The patient has appropriate mood and affect.
 
LABORATORY DATA:  ECG shows a V-paced rhythm.  Troponin I is 0.06.  Hemoglobin
11.8, white blood count is 9.4 and platelet count 193,000.  Sodium is 135,
potassium 3.6, chloride 98, CO2 is 30, BUN is 24, creatinine is 0.9.  Lipids are
pending.
 
INR from [place] is not available.
 
IMPRESSION:
1.  Unstable angina.  His symptoms are typical for his pre-PCI type angina type
symptoms despite aggressive medical therapy including Plavix.  After discussing
with the patient different diagnostic and treatment options, he elected to
proceed with diagnostic cardiac catheterization.
2.  Status post coronary artery bypass graft.  He has a history of 3-vessel
coronary artery bypass graft.
3.  Ischemic cardiomyopathy, chronic systolic dysfunction.  He presents without
evidence of congestive heart failure clinically.
4.  Status post ICD.  I will continue with telemetry monitoring.  He reports no
ICD shocks.
5.  Warfarin anticoagulation.  We will hold his warfarin for his planned
procedure.
 
 
<ELECTRONICALLY SIGNED>
                                        By:  Luan Byrnes MD, FACC   
03/28/19     1106
D: 03/03/19 1148_______________________________________
T: 03/04/19 0237Luan Byrnes MD, FACC      /nt

## 2021-12-20 ENCOUNTER — HOSPITAL ENCOUNTER (INPATIENT)
Dept: HOSPITAL 96 - M.ERS | Age: 66
LOS: 4 days | Discharge: HOME | DRG: 286 | End: 2021-12-24
Attending: STUDENT IN AN ORGANIZED HEALTH CARE EDUCATION/TRAINING PROGRAM | Admitting: STUDENT IN AN ORGANIZED HEALTH CARE EDUCATION/TRAINING PROGRAM
Payer: MEDICARE

## 2021-12-20 VITALS — SYSTOLIC BLOOD PRESSURE: 148 MMHG | DIASTOLIC BLOOD PRESSURE: 86 MMHG

## 2021-12-20 VITALS — HEIGHT: 72.01 IN | WEIGHT: 211 LBS | BODY MASS INDEX: 28.58 KG/M2

## 2021-12-20 DIAGNOSIS — I50.43: ICD-10-CM

## 2021-12-20 DIAGNOSIS — I25.5: ICD-10-CM

## 2021-12-20 DIAGNOSIS — I11.0: ICD-10-CM

## 2021-12-20 DIAGNOSIS — G47.33: ICD-10-CM

## 2021-12-20 DIAGNOSIS — Z20.822: ICD-10-CM

## 2021-12-20 DIAGNOSIS — E11.649: ICD-10-CM

## 2021-12-20 DIAGNOSIS — E78.5: ICD-10-CM

## 2021-12-20 DIAGNOSIS — I10: ICD-10-CM

## 2021-12-20 DIAGNOSIS — Z79.4: ICD-10-CM

## 2021-12-20 DIAGNOSIS — I25.110: Primary | ICD-10-CM

## 2021-12-20 LAB
ABSOLUTE BASOPHILS: 0.1 THOU/UL (ref 0–0.2)
ABSOLUTE EOSINOPHILS: 0.3 THOU/UL (ref 0–0.7)
ABSOLUTE MONOCYTES: 0.7 THOU/UL (ref 0–1.2)
ALBUMIN SERPL-MCNC: 3.4 G/DL (ref 3.4–5)
ALP SERPL-CCNC: 196 U/L (ref 46–116)
ALT SERPL-CCNC: 67 U/L (ref 30–65)
ANION GAP SERPL CALC-SCNC: 9 MMOL/L (ref 7–16)
AST SERPL-CCNC: 72 U/L (ref 15–37)
BASOPHILS NFR BLD AUTO: 0.6 %
BILIRUB SERPL-MCNC: 0.6 MG/DL
BUN SERPL-MCNC: 19 MG/DL (ref 7–18)
CALCIUM SERPL-MCNC: 8.8 MG/DL (ref 8.5–10.1)
CHLORIDE SERPL-SCNC: 106 MMOL/L (ref 98–107)
CO2 SERPL-SCNC: 27 MMOL/L (ref 21–32)
CREAT SERPL-MCNC: 1.3 MG/DL (ref 0.6–1.3)
EOSINOPHIL NFR BLD: 3 %
GLUCOSE SERPL-MCNC: 110 MG/DL (ref 70–99)
GRANULOCYTES NFR BLD MANUAL: 72.4 %
HCT VFR BLD CALC: 46.4 % (ref 42–52)
HGB BLD-MCNC: 14.6 GM/DL (ref 14–18)
LYMPHOCYTES # BLD: 1.7 THOU/UL (ref 0.8–5.3)
LYMPHOCYTES NFR BLD AUTO: 17.1 %
MAGNESIUM SERPL-MCNC: 1.6 MG/DL (ref 1.8–2.4)
MCH RBC QN AUTO: 26.2 PG (ref 26–34)
MCHC RBC AUTO-ENTMCNC: 31.4 G/DL (ref 28–37)
MCV RBC: 83.5 FL (ref 80–100)
MONOCYTES NFR BLD: 6.9 %
MPV: 7.6 FL. (ref 7.2–11.1)
NEUTROPHILS # BLD: 7.1 THOU/UL (ref 1.6–8.1)
NUCLEATED RBCS: 0 /100WBC
PLATELET COUNT*: 262 THOU/UL (ref 150–400)
POTASSIUM SERPL-SCNC: 4.7 MMOL/L (ref 3.5–5.1)
PROT SERPL-MCNC: 7.3 G/DL (ref 6.4–8.2)
RBC # BLD AUTO: 5.55 MIL/UL (ref 4.5–6)
RDW-CV: 18.7 % (ref 10.5–14.5)
SODIUM SERPL-SCNC: 142 MMOL/L (ref 136–145)
WBC # BLD AUTO: 9.8 THOU/UL (ref 4–11)

## 2021-12-21 VITALS — SYSTOLIC BLOOD PRESSURE: 126 MMHG | DIASTOLIC BLOOD PRESSURE: 59 MMHG

## 2021-12-21 VITALS — DIASTOLIC BLOOD PRESSURE: 89 MMHG | SYSTOLIC BLOOD PRESSURE: 144 MMHG

## 2021-12-21 VITALS — SYSTOLIC BLOOD PRESSURE: 126 MMHG | DIASTOLIC BLOOD PRESSURE: 78 MMHG

## 2021-12-21 VITALS — DIASTOLIC BLOOD PRESSURE: 85 MMHG | SYSTOLIC BLOOD PRESSURE: 140 MMHG

## 2021-12-21 VITALS — SYSTOLIC BLOOD PRESSURE: 144 MMHG | DIASTOLIC BLOOD PRESSURE: 89 MMHG

## 2021-12-21 VITALS — SYSTOLIC BLOOD PRESSURE: 148 MMHG | DIASTOLIC BLOOD PRESSURE: 86 MMHG

## 2021-12-21 VITALS — SYSTOLIC BLOOD PRESSURE: 150 MMHG | DIASTOLIC BLOOD PRESSURE: 88 MMHG

## 2021-12-21 VITALS — DIASTOLIC BLOOD PRESSURE: 76 MMHG | SYSTOLIC BLOOD PRESSURE: 132 MMHG

## 2021-12-21 LAB
ABSOLUTE BASOPHILS: 0.1 THOU/UL (ref 0–0.2)
ABSOLUTE EOSINOPHILS: 0.1 THOU/UL (ref 0–0.7)
ABSOLUTE MONOCYTES: 0.5 THOU/UL (ref 0–1.2)
ANION GAP SERPL CALC-SCNC: 12 MMOL/L (ref 7–16)
BASOPHILS NFR BLD AUTO: 1.2 %
BUN SERPL-MCNC: 20 MG/DL (ref 7–18)
CALCIUM SERPL-MCNC: 8.5 MG/DL (ref 8.5–10.1)
CHLORIDE SERPL-SCNC: 102 MMOL/L (ref 98–107)
CO2 SERPL-SCNC: 25 MMOL/L (ref 21–32)
CREAT SERPL-MCNC: 1 MG/DL (ref 0.6–1.3)
EOSINOPHIL NFR BLD: 1 %
GLUCOSE SERPL-MCNC: 169 MG/DL (ref 70–99)
GRANULOCYTES NFR BLD MANUAL: 81 %
HCT VFR BLD CALC: 43.9 % (ref 42–52)
HGB BLD-MCNC: 14 GM/DL (ref 14–18)
LYMPHOCYTES # BLD: 0.9 THOU/UL (ref 0.8–5.3)
LYMPHOCYTES NFR BLD AUTO: 11.2 %
MAGNESIUM SERPL-MCNC: 1.6 MG/DL (ref 1.8–2.4)
MCH RBC QN AUTO: 26.3 PG (ref 26–34)
MCHC RBC AUTO-ENTMCNC: 31.9 G/DL (ref 28–37)
MCV RBC: 82.6 FL (ref 80–100)
MONOCYTES NFR BLD: 5.6 %
MPV: 7.5 FL. (ref 7.2–11.1)
NEUTROPHILS # BLD: 6.5 THOU/UL (ref 1.6–8.1)
NUCLEATED RBCS: 0 /100WBC
PHOSPHATE SERPL-MCNC: 4.3 MG/DL (ref 2.5–4.9)
PLATELET COUNT*: 251 THOU/UL (ref 150–400)
POTASSIUM SERPL-SCNC: 3.6 MMOL/L (ref 3.5–5.1)
RBC # BLD AUTO: 5.31 MIL/UL (ref 4.5–6)
RDW-CV: 18.2 % (ref 10.5–14.5)
SODIUM SERPL-SCNC: 139 MMOL/L (ref 136–145)
WBC # BLD AUTO: 8.1 THOU/UL (ref 4–11)

## 2021-12-21 NOTE — EKG
[place] [address]
Phone:  [phone]                     ELECTROCARDIOGRAM REPORT      
_______________________________________________________________________________
 
Name:         MAR ALLEN             Room:          [address]#:    [id number]     Account #:     [id number]  
Admission:    21    Attend Phys:   Osvaldo Majano
Discharge:                Date of Birth: [birth year]  
Date of Service: 21  Report #:      [id number]
        [id number]
_______________________________________________________________________________
THIS REPORT FOR:  //name//                      
 
                         [facility] ED
                                       
Test Date:    2021               Test Time:    19:16:06
Pat Name:     MAR ALLEN            Department:   
Patient ID:   [id number]            Room:         [facility]
Gender:       M                        Technician:   FL
:          1955               Requested By: Kaitlin Coppola
Order Number: [id number] MD:   Micah Mota
                                 Measurements
Intervals                              Axis          
Rate:         70                       P:            0
NE:           55                       QRS:          224
QRSD:         128                      T:            0
QT:           420                                    
QTc:          454                                    
                           Interpretive Statements
Ventricular-paced rhythm
atrial flutter
No further analysis attempted due to paced rhythm
Baseline wander in lead(s) V5
Compared to ECG 2019 17:35:18
Atrial-sensed  rhythm no longer present
atrial flutter now seen
Electronically Signed On 2021 12:22:13 CST by Micah Mota
[email or web address]
 
 
 
 
 
 
 
 
 
 
 
 
 
 
 
 
 
  <ELECTRONICALLY SIGNED>
                                           By: Micah Mota MD, FACC      
  21     1222
D: 21   _____________________________________
T: 21   Micah Mota MD, FAC        /EPI

## 2021-12-21 NOTE — NUR
PT HAVING 39 SEC PERIODS OF SLEEP APNEA. 02 SAT DROPS TO 87%. PT PUT ON 2L PER
NC. CURRENTLY SATING AT 99%.

## 2021-12-22 VITALS — SYSTOLIC BLOOD PRESSURE: 135 MMHG | DIASTOLIC BLOOD PRESSURE: 93 MMHG

## 2021-12-22 VITALS — DIASTOLIC BLOOD PRESSURE: 64 MMHG | SYSTOLIC BLOOD PRESSURE: 108 MMHG

## 2021-12-22 VITALS — DIASTOLIC BLOOD PRESSURE: 93 MMHG | SYSTOLIC BLOOD PRESSURE: 138 MMHG

## 2021-12-22 VITALS — DIASTOLIC BLOOD PRESSURE: 95 MMHG | SYSTOLIC BLOOD PRESSURE: 161 MMHG

## 2021-12-22 VITALS — SYSTOLIC BLOOD PRESSURE: 137 MMHG | DIASTOLIC BLOOD PRESSURE: 86 MMHG

## 2021-12-22 VITALS — DIASTOLIC BLOOD PRESSURE: 90 MMHG | SYSTOLIC BLOOD PRESSURE: 145 MMHG

## 2021-12-22 VITALS — SYSTOLIC BLOOD PRESSURE: 136 MMHG | DIASTOLIC BLOOD PRESSURE: 91 MMHG

## 2021-12-22 VITALS — SYSTOLIC BLOOD PRESSURE: 133 MMHG | DIASTOLIC BLOOD PRESSURE: 91 MMHG

## 2021-12-22 VITALS — SYSTOLIC BLOOD PRESSURE: 90 MMHG | DIASTOLIC BLOOD PRESSURE: 52 MMHG

## 2021-12-22 VITALS — SYSTOLIC BLOOD PRESSURE: 120 MMHG | DIASTOLIC BLOOD PRESSURE: 71 MMHG

## 2021-12-22 VITALS — SYSTOLIC BLOOD PRESSURE: 141 MMHG | DIASTOLIC BLOOD PRESSURE: 92 MMHG

## 2021-12-22 VITALS — SYSTOLIC BLOOD PRESSURE: 143 MMHG | DIASTOLIC BLOOD PRESSURE: 88 MMHG

## 2021-12-22 VITALS — DIASTOLIC BLOOD PRESSURE: 90 MMHG | SYSTOLIC BLOOD PRESSURE: 150 MMHG

## 2021-12-22 VITALS — DIASTOLIC BLOOD PRESSURE: 89 MMHG | SYSTOLIC BLOOD PRESSURE: 141 MMHG

## 2021-12-22 VITALS — SYSTOLIC BLOOD PRESSURE: 163 MMHG | DIASTOLIC BLOOD PRESSURE: 92 MMHG

## 2021-12-22 LAB
CHOLEST SERPL-MCNC: 125 MG/DL (ref ?–200)
HDLC SERPL-MCNC: 34 MG/DL (ref 40–?)
INR PPP: 1.8
LDLC SERPL-MCNC: 80 MG/DL (ref ?–100)
PROTHROMBIN TIME: 18 SECONDS (ref 9.2–11.5)
TC:HDL: 3.7 RATIO
TRIGL SERPL-MCNC: 58 MG/DL (ref ?–150)
VLDLC SERPL CALC-MCNC: 12 MG/DL (ref ?–40)

## 2021-12-22 PROCEDURE — 4A023N7 MEASUREMENT OF CARDIAC SAMPLING AND PRESSURE, LEFT HEART, PERCUTANEOUS APPROACH: ICD-10-PCS | Performed by: INTERNAL MEDICINE

## 2021-12-22 PROCEDURE — B2111ZZ FLUOROSCOPY OF MULTIPLE CORONARY ARTERIES USING LOW OSMOLAR CONTRAST: ICD-10-PCS | Performed by: INTERNAL MEDICINE

## 2021-12-22 PROCEDURE — B2181ZZ FLUOROSCOPY OF LEFT INTERNAL MAMMARY BYPASS GRAFT USING LOW OSMOLAR CONTRAST: ICD-10-PCS | Performed by: INTERNAL MEDICINE

## 2021-12-22 PROCEDURE — B2131ZZ FLUOROSCOPY OF MULTIPLE CORONARY ARTERY BYPASS GRAFTS USING LOW OSMOLAR CONTRAST: ICD-10-PCS | Performed by: INTERNAL MEDICINE

## 2021-12-22 PROCEDURE — B2151ZZ FLUOROSCOPY OF LEFT HEART USING LOW OSMOLAR CONTRAST: ICD-10-PCS | Performed by: INTERNAL MEDICINE

## 2021-12-22 NOTE — NUR
PATIENT ALERT/ORIENTED X4.  BLOOD SUGAR AT 2100 = 93; SNACK PROVIDED. PT
REQUESTED BLOOD SUGAR BE RECHECKED AT MIDNIGHT.  WAS FOUND TO BE 63.  JUICE,
PEANUT BUTTER AND CRACKERS GIVEN.  PT FELT THIS WOULD HOLD HIM UNTIL MORNING.
PT UP AD MARYBEL TO BATHROOM.  PT SALINE LOCKED.  PT VPACED ON CARDIAC MONITOR.
FREQUENTLY  USED ITEMS AND CALL LIGHT WITHIN REACH.  SIDERAILS UPX2.  WILL
CONTINUE TO MONITOR.

## 2021-12-22 NOTE — CARD
[address]  [address]                    CARDIAC CATH REPORT           
_______________________________________________________________________________
 
Name:       MAR ALLEN              Room:           M222-P    ADM IN  
M.R.#:  [id number]      Account #:      [id number]  
Admission:  12/20/21     Attend Phys:    CAROLE Pickett
Discharge:               Date of Birth:  [birth year]  
         Report #: [id number]
                                                                     [id number]
_______________________________________________________________________________
THIS REPORT FOR:  
 
cc:  Griffin Sargent,Micah Barron MD [facility]                                            ~
 
 
--------------- APPROVED REPORT --------------
 
 
Study performed:  12/22/2021 14:33:58
 
Patient Details
Patient Status: In-Patient                  Room #: 222
The patient is a 66 year-old male
 
Event Personnel
Juan Manuel Peres RTR Monitor, April Hughes RTR ScrubWarren Janel RN RN,
 Micah Mota  Interventional Cardiologist
 
Procedures Performed
Left Heart Cath Coronaries, By[facility] Grafts [id number] [facility] 
Hemostasis w/ Angioseal
 
Indication
Arrhythmia, Cardiomyopathy, Chest pain
 
Risk Factors
Hypercholesterolemia, Coronary Artery Disease, Diabetes 
 
Previous Procedures/Diagnoses
Previous CABG
 
Admission/Lab Medications/Medications given during 
procedure
Midazolam (Versed) IV 2 mg, Fentanyl IV 25 mcg, Lidocaine Subcut 20 
ml
 
Procedure Narrative
The patient was brought electively to the Cardiac Catheterization 
Laboratory and was prepped and draped in a sterile manner. The right 
femoral was infiltrated with 2% Lidocaine subcutaneous anesthesia. IV 
conscious sedation was used throughout procedure with appropriate 
monitoring and was performed in the presence of a registered nurse 
who was an independent trained observer other than the physician 
performing the procedure. A 6fr Ultimum Sheath sheath was inserted 
 
 
 
[place] [address]                    CARDIAC CATH REPORT           
_______________________________________________________________________________
 
Name:       MAR ALLEN              Room:           [address] [place]  
.R.#:  [id number]      Account #:      [id number]  
Admission:  12/20/21     Attend Phys:    CAROLE Pickett
Discharge:               Date of Birth:  [birth year]  
         Report #: [id number]
                                                                     [id number]
_______________________________________________________________________________
 
into the right femoral artery. Coronary angiography was performed 
using coronary diagnostic catheters. The right coronary system was 
accessed and visualized with a Diagnostic 6Fr JR4 catheter. The left 
coronary system was accessed and visualized with a Diagnostic 6Fr JL4 
catheter. The left ventricle was accessed and visualized with a 
Diagnostic 6Fr Pigtail catheter. Left ventricular/Aortic Valve 
gradient assessed via catheter pullback. Left ventriculogram was 
performed in STEINER projection. Closure device was deployed with a 6 Fr 
Angioseal. The patient tolerated the procedure well and there were no 
complications associated with the procedure. There was no hematoma. 
LIMA graft was visualized with a 6 Sami LIMA catheter. SVG to the 
circumflex was visualized with a 6 Sami JL4 catheter. SVG to the 
RCA was visualized with a 6 Sami JL4 catheter.
 
Intraoperative Conscious Sedation
Sedation start time:  1503           Case end Time:  
1537    
Fentanyl  25 mcg    Versed  2 mg  
 
Fluoro Time:    5.6 minutes     
Dose:     DAP 88326 cGycm2  1504.84 mGy  
Contrast Type and Amount:  Omnipaque 165 mL    
 
Coronary Angiography
The patient's coronary anatomy is co- dominant. 
 
Native Artery Percent Stenosis
LIMA graft to the mid LAD wa widely open althougjh there was a 90% 
stenosis noted antegrade to the graft that filled the proximal LAD.  
SVG to the 3rd marginal branch of the circumflex was widely open and 
filled the distal circumflex by antegrade flow.  The SVG to the Right 
PDA had a 60% mid stenosis.
 
Diagnostic Cath
Left Main 30% distal stenosis.
LAD  ostial LAD was chronically 100% occluded.
Circumflex mid circumflex was 100% chronically occluded.  
OM1  small vesses with 80% proximal stenosis
Right Coronary 60% mid stenosis and 70% distal stenosis.
R PDA  100% ostial chronic occlusion.
 
Left Ventriculography
The left ventricular ejection fraction is estimated to be 15-20%. 
There is 2+ mitral insufficiency. Global hypokinesis noted of the 
left ventricle.
 
 
 
[place] [address]                    CARDIAC CATH REPORT           
_______________________________________________________________________________
 
Name:       TIFFANYMAR FADY              Room:           [address] [place]  
[facility]#:  [id number]      Account #:      [id number]  
Admission:  12/20/21     Attend Phys:    CAROLE Pickett
Discharge:               Date of Birth:  [birth year]  
         Report #: [id number]
                                                                     [id number]
_______________________________________________________________________________
 
 
Hemodynamics
The aortic pressure is 108/56 mmHg with a mean of 76 mmHg. The left 
ventricular pressure is 102/10 mmHg with a mean of mmHg. The left 
ventricular end diastolic pressure is 20 mmHg. There was no gradient 
across the aortic valve upon pullback. Pullback from the left 
ventricle to the aorta revealed no gradient across the aortic 
valve.
 
Conclusion
1.  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
2.  Patent LIMA graft to the mid LAD
3.  Patent SVG to the distal circumflex artery.
4.  60% stenosis noted in the mid SVG to the right PDA of the distal 
RCA
5.  LVEF 15-20% 
 
Recommendations
consider EN and cardioversion of atrial flutter.
 
 
 
 
 
 
 
 
 
 
 
 
 
 
 
 
 
 
 
 
 
 
 
 
<ELECTRONICALLY SIGNED>
                                        By:  Micah Mota MD, FACC      
12/22/21 1728
D: 12/22/21 1728_______________________________________
T: 12/22/21 1728Dayogesh Mota MD, FACC         /INF

## 2021-12-22 NOTE — NUR
Patient returned from cath lab with out incident.  Post cath protocol and
vitals inititated. Dressing to right groin is dry and intact patient is alert
and voices no complaints of pain or discomfort at this time. Will contnue with
POC. RN reviewed assessmnet and charting.

## 2021-12-22 NOTE — NUR
CM ASSESSMENT:
PT A&O, INDEPENDENT WITH ADL'S, AND ACTIVE. PT RESIDES AT HOME WITH SPOUSE. PT
USES 0 DME, BUT INFORMS THAT HE HAD THE INSPIRE RESPIRATORY DEVICE IMPLANTED A
YEAR AGO. PT HAS 0 HX F HH OR SNF. NO CM D/C PLANNING NEEDS ANTICIPATED AT
THIS TIME. CM WILL REMAIN AVAILABLE TO ASSIST AND FOLLOW AS NEEDED.

## 2021-12-22 NOTE — NUR
This AM patient had a low BS of 40 at 0820 patient was alert and given orange
juice at 0856 patient BS was 38 oral glucose given per protocol and dextrose
IV push per RN. at 0911 BS up to 57, at 0935 BS 63, at 1050 BS up to 190. Mid
line was placed in upper left arm d/t peripheral access no longer patent.
Patient currently down for a procedure. Will continue with POC.

## 2021-12-23 VITALS — SYSTOLIC BLOOD PRESSURE: 98 MMHG | DIASTOLIC BLOOD PRESSURE: 59 MMHG

## 2021-12-23 VITALS — DIASTOLIC BLOOD PRESSURE: 92 MMHG | SYSTOLIC BLOOD PRESSURE: 152 MMHG

## 2021-12-23 VITALS — DIASTOLIC BLOOD PRESSURE: 63 MMHG | SYSTOLIC BLOOD PRESSURE: 112 MMHG

## 2021-12-23 VITALS — DIASTOLIC BLOOD PRESSURE: 88 MMHG | SYSTOLIC BLOOD PRESSURE: 150 MMHG

## 2021-12-23 VITALS — DIASTOLIC BLOOD PRESSURE: 77 MMHG | SYSTOLIC BLOOD PRESSURE: 121 MMHG

## 2021-12-23 VITALS — DIASTOLIC BLOOD PRESSURE: 93 MMHG | SYSTOLIC BLOOD PRESSURE: 154 MMHG

## 2021-12-23 VITALS — SYSTOLIC BLOOD PRESSURE: 108 MMHG | DIASTOLIC BLOOD PRESSURE: 58 MMHG

## 2021-12-23 VITALS — SYSTOLIC BLOOD PRESSURE: 136 MMHG | DIASTOLIC BLOOD PRESSURE: 76 MMHG

## 2021-12-23 VITALS — SYSTOLIC BLOOD PRESSURE: 123 MMHG | DIASTOLIC BLOOD PRESSURE: 86 MMHG

## 2021-12-23 VITALS — SYSTOLIC BLOOD PRESSURE: 152 MMHG | DIASTOLIC BLOOD PRESSURE: 93 MMHG

## 2021-12-23 VITALS — SYSTOLIC BLOOD PRESSURE: 115 MMHG | DIASTOLIC BLOOD PRESSURE: 71 MMHG

## 2021-12-23 VITALS — DIASTOLIC BLOOD PRESSURE: 80 MMHG | SYSTOLIC BLOOD PRESSURE: 131 MMHG

## 2021-12-23 VITALS — DIASTOLIC BLOOD PRESSURE: 75 MMHG | SYSTOLIC BLOOD PRESSURE: 120 MMHG

## 2021-12-23 VITALS — SYSTOLIC BLOOD PRESSURE: 108 MMHG | DIASTOLIC BLOOD PRESSURE: 63 MMHG

## 2021-12-23 VITALS — DIASTOLIC BLOOD PRESSURE: 70 MMHG | SYSTOLIC BLOOD PRESSURE: 118 MMHG

## 2021-12-23 VITALS — DIASTOLIC BLOOD PRESSURE: 94 MMHG | SYSTOLIC BLOOD PRESSURE: 149 MMHG

## 2021-12-23 VITALS — DIASTOLIC BLOOD PRESSURE: 59 MMHG | SYSTOLIC BLOOD PRESSURE: 98 MMHG

## 2021-12-23 VITALS — DIASTOLIC BLOOD PRESSURE: 84 MMHG | SYSTOLIC BLOOD PRESSURE: 148 MMHG

## 2021-12-23 LAB
ANION GAP SERPL CALC-SCNC: 10 MMOL/L (ref 7–16)
BUN SERPL-MCNC: 24 MG/DL (ref 7–18)
CALCIUM SERPL-MCNC: 8.9 MG/DL (ref 8.5–10.1)
CHLORIDE SERPL-SCNC: 103 MMOL/L (ref 98–107)
CO2 SERPL-SCNC: 26 MMOL/L (ref 21–32)
CREAT SERPL-MCNC: 1.3 MG/DL (ref 0.6–1.3)
EST. AVERAGE GLUCOSE BLD GHB EST-MCNC: 137 MG/DL
GLUCOSE SERPL-MCNC: 196 MG/DL (ref 70–99)
GLYCOHEMOGLOBIN (HGB A1C): 6.4 % (ref 4.8–5.6)
HCT VFR BLD CALC: 43.5 % (ref 42–52)
HGB BLD-MCNC: 13.8 GM/DL (ref 14–18)
INR PPP: 1.5
MCH RBC QN AUTO: 26.1 PG (ref 26–34)
MCHC RBC AUTO-ENTMCNC: 31.8 G/DL (ref 28–37)
MCV RBC: 82.1 FL (ref 80–100)
MPV: 7.9 FL. (ref 7.2–11.1)
PLATELET COUNT*: 261 THOU/UL (ref 150–400)
POTASSIUM SERPL-SCNC: 4.4 MMOL/L (ref 3.5–5.1)
PROTHROMBIN TIME: 15 SECONDS (ref 9.2–11.5)
RBC # BLD AUTO: 5.29 MIL/UL (ref 4.5–6)
RDW-CV: 18.1 % (ref 10.5–14.5)
SODIUM SERPL-SCNC: 139 MMOL/L (ref 136–145)
WBC # BLD AUTO: 9.2 THOU/UL (ref 4–11)

## 2021-12-23 PROCEDURE — B246ZZ4 ULTRASONOGRAPHY OF RIGHT AND LEFT HEART, TRANSESOPHAGEAL: ICD-10-PCS | Performed by: INTERNAL MEDICINE

## 2021-12-23 PROCEDURE — 5A2204Z RESTORATION OF CARDIAC RHYTHM, SINGLE: ICD-10-PCS | Performed by: INTERNAL MEDICINE

## 2021-12-23 NOTE — EKG
[place] [address]
Phone:  [phone]                     ELECTROCARDIOGRAM REPORT      
_______________________________________________________________________________
 
Name:         MAR ALLEN             Room:          [address]    ADM IN 
.R.#:    [id number]     Account #:     [id number]  
Admission:    21    Attend Phys:   Osvaldo Majano
Discharge:                Date of Birth: [birth year]  
Date of Service: 21 1248  Report #:      [id number]
        [id number]
_______________________________________________________________________________
THIS REPORT FOR:  //name//                      
 
                          [facility]
                                       
Test Date:    2021               Test Time:    12:48:08
Pat Name:     MAR ALLEN            Department:   
Patient ID:   [id number]            Room:         [address]
Gender:       M                        Technician:   
:          1955               Requested By: Micah Mota
Order Number: [id number]    Reading MD:   Micah Mota
                                 Measurements
Intervals                              Axis          
Rate:         72                       P:            6
TX:           170                      QRS:          235
QRSD:         140                      T:            35
QT:           499                                    
QTc:          547                                    
                           Interpretive Statements
Atrial-sensed ventricular-paced rhythm
No further analysis attempted due to paced rhythm
Compared to ECG 2021 19:16:06
Atrial flutter no longer present
Electronically Signed On 2021 13:43:01 CST by Micah Mota
[email or web address]
 
 
 
 
 
 
 
 
 
 
 
 
 
 
 
 
 
 
 
 
  <ELECTRONICALLY SIGNED>
                                           By: Micah Mota MD, [facility]      
  21     1343
D: 21 1248   _____________________________________
T: 21 1248   Micah Mota MD, [facility]        /EPI

## 2021-12-23 NOTE — NUR
PATIENT AWAKE MOST OF THE NIGHT.  PT WITH WITH 2100 BLOOD SUGAR .
CONCERNS ABOUT PT DROPPING DURING THE NIGHT SINCE HE WOULD BE NPO.  MESSAGE
SENT TO  AND ORDER RECEIVED TO START PT ON D5 1/2NS @ 80ML/HR.  PT WITH
MIDLINE IN RT UPPER ARM.  PT SAID HE FELT LOW AT 0300 AND WAS CHECKED.  BLOOD
SUGAR .  PT UP TO BATHROOM SEVERAL TIMES WITH STANDBY ASSIST TO VOID.
PT WITH SATS AT 84% ON ROOM AIR AT BEGINNING OF SHIFT.  PLACED ON O2 @ 2
LITERS AND RT NOTIFIED.  PT LATER REFUSING TO WEAR O2 SAYING IT WAS BLOWING
TOO HARD IN HIS NOSE.  EDUCATION GIVEN.  PT REQUESTED FAN; PROVIDED.  PT NPO
SINCE MIDNIGHT FOR EN AND CARDIOVERSION TODAY.  FREQUENTLY USED ITERMS AND
CALL LIGHT WITHIN REACH.  SIDERAILS UP X2 AND BED ALARM ON.  WILL CONTINUE TO
MONITOR.

## 2021-12-23 NOTE — TEE
[place] [address]
Phone:  [phone]                     TRANSESOPHAGEAL ECHOCARDIOGRAM
_______________________________________________________________________________
 
Name:         MAR ALLEN             Room:          [address] [place] 
[facility]#:    [id number]     Account #:     [id number]  
Admission:    12/20/21    Attend Phys:   Osvaldo Majano
Discharge:                Date of Birth: [birth year]  
Date of Service: 12/23/21 1519  Report #:      [id number]
        [id number]
_______________________________________________________________________________
THIS REPORT FOR:
 
cc:  Griffin Sargent Aaron DO Blick, David R. MD Located within [facility]        
                                                                       ~
 
--------------- APPROVED REPORT --------------
 
 
Study performed:  12/23/2021 12:06:33
 
EXAM: Transesophageal Echocardiogram 
Patient Location: In-Patient  
Room #:  [facility]    
     Status:  routine
 
      BSA:         2.18
HR: 70 bpm BP:          147/100 mmHg 
Rhythm: NSR     
 
Other Information 
Study Quality: Good
 
Indications
Atrial Fibrillation
 
Echo Enhancing Agent
Indication: Rule out Shunt
Agent(s) / Amount(s) Used: Agitated Saline 20 cc
Comments: 2 bubble studies
 
Procedure
After obtaining informed consent, patient underwent transesophageal 
echo in the [facility]. 
Type of Sedation : Conscious Sedation
Sedation was administered by Patel Cruz RN. 
Sedation start time:  1225           Case end Time:  1235
Sedation was achieved intravenously with:  Versed (4)    Fentanyl 
(25)    
Transesophageal probe was inserted and advanced into esophagus 
without difficulty by Micah Mota MD, FACC.
Echo enhancement indication: R/O Septal defect.
Echo enhancement agent administered: Agitated Saline
The EN was performed without complications. 
Synchronized Cardioversion acheived with 100 Joules after 1 
 
 
[address] [address]
Phone:  [phone]                     TRANSESOPHAGEAL ECHOCARDIOGRAM
_______________________________________________________________________________
 
Name:         MAR ALLEN             Room:          [address] [place] 
[facility].#:    [id number]     Account #:     [id number]  
Admission:    12/20/21    Attend Phys:   Osvaldo Majano
Discharge:                Date of Birth: [birth year]  
Date of Service: 12/23/21 1519  Report #:      [id number]
        [id number]
_______________________________________________________________________________
attempt(s). 
Rhythm following Synchronized Cardioversion: Paced Rhythm 
Throughout the procedure, the blood pressure, pulse oximetry, cardiac 
rhythm, and rate were monitored.
The patient tolerated the procedure without adverse effects. Recovery 
from conscious sedation was uneventful and vital signs were 
stable.
 
Left Ventricle
Left ventricle is moderately dilated. There is severe global 
hypokinesis of the left ventricle. There is normal left ventricular 
wall thickness. Left ventricular systolic function is severely 
decreased. LVEF is 15-20%.
 
Right Ventricle
The right ventricle is normal size. The right ventricular systolic 
function is normal. Pacemaker lead is present in the right ventricle. 
 
Atria
Left atrium is severely dilated. No thrombus is visualized in the 
left atrium or appendage. The interatrial septum is intact with no 
evidence for an atrial septal defect. The right atrium size is 
normal.
 
Aortic Valve
Mild aortic valve sclerosis. No aortic regurgitation is present. 
There is no aortic valvular stenosis.
 
Mitral Valve
The mitral valve is normal in structure. Moderate mitral 
regurgitation. No evidence of mitral valve stenosis.
 
Tricuspid Valve
The tricuspid valve is normal in structure. There is no tricuspid 
valve regurgitation noted.
 
Pulmonic Valve
The pulmonary valve is normal in structure. There is no pulmonic 
valvular regurgitation.
 
Great Vessels
The aortic root is normal in size. Atherosclerotic plaque is present 
in the descending aorta.
 
Pericardium
There is no pericardial effusion.
 
 
[place] [address]
Phone:  [phone]                     TRANSESOPHAGEAL ECHOCARDIOGRAM
_______________________________________________________________________________
 
Name:         MAR ALLEN             Room:          [address] [place] 
..#:    [id number]     Account #:     [id number]  
Admission:    12/20/21    Attend Phys:   Osvaldo Majano
Discharge:                Date of Birth: [birth year]  
Date of Service: 12/23/21 1519  Report #:      [id number]
        [id number]
_______________________________________________________________________________
 
<Conclusion>
LVEF is 15-20%.
 
Mild aortic valve sclerosis.
Moderate mitral regurgitation.
Left atrium is severely dilated.
No thrombus is visualized in the left atrium or appendage.
The interatrial septum is intact with no evidence for an atrial 
septal defect.
successful cardioversion of atria flutter to a paced rhythm
 
 
 
 
 
 
 
 
 
 
 
 
 
 
 
 
 
 
 
 
 
 
 
 
 
 
 
 
 
 
 
 
 
  <ELECTRONICALLY SIGNED>
                                           By: Micah Mota MD, Located within [facility]      
  12/23/21 1519
D: 12/23/21 1519   _____________________________________
T: 12/23/21 1519   Micah Mota MD, FACC        /INF

## 2021-12-24 VITALS — SYSTOLIC BLOOD PRESSURE: 122 MMHG | DIASTOLIC BLOOD PRESSURE: 81 MMHG

## 2021-12-24 VITALS — SYSTOLIC BLOOD PRESSURE: 115 MMHG | DIASTOLIC BLOOD PRESSURE: 79 MMHG

## 2021-12-24 VITALS — SYSTOLIC BLOOD PRESSURE: 138 MMHG | DIASTOLIC BLOOD PRESSURE: 90 MMHG

## 2021-12-24 VITALS — SYSTOLIC BLOOD PRESSURE: 140 MMHG | DIASTOLIC BLOOD PRESSURE: 80 MMHG

## 2021-12-24 LAB
INR PPP: 1.8
PROTHROMBIN TIME: 18.3 SECONDS (ref 9.2–11.5)

## 2021-12-24 NOTE — NUR
PT ALERT AND ORIENTED, UP STANDBY ASSIST TO RESTROOM AND ROOM AIR. FALL
PRECAUTIONS IN PLACE, BED ALARM ON. HE AT TIMES DID NOT USE CALL LIGHT TO GET
UP. HE IS USING WALKER. RECEIVED ALL MEDS AS SCHEDULED. V PACED ON MONITOR. NO
REPORTS OF ANY S/S INFECTION OR SEPSIS. WILL CONTINUE TO MONITOR.

## 2021-12-24 NOTE — EKG
[place] [address]
Phone:  [phone]                     ELECTROCARDIOGRAM REPORT      
_______________________________________________________________________________
 
Name:         MAR ALLEN             Room:          [address]    ADM IN 
.R.#:    [id number]     Account #:     [id number]  
Admission:    21    Attend Phys:   Osvaldo Majano
Discharge:                Date of Birth: [birth year]  
Date of Service: 21 1020  Report #:      [id number]
        [id number]
_______________________________________________________________________________
THIS REPORT FOR:  //name//                      
 
                          [facility]
                                       
Test Date:    2021               Test Time:    10:20:30
Pat Name:     MAR ALLEN            Department:   
Patient ID:   [id number]            Room:         [address]
Gender:       M                        Technician:   HERNAN
:          1955               Requested By: Micah Mota
Order Number: [id number]    Fariha MD:   Micah Mota
                                 Measurements
Intervals                              Axis          
Rate:         77                       P:            41
NJ:           170                      QRS:          235
QRSD:         139                      T:            53
QT:           477                                    
QTc:          540                                    
                           Interpretive Statements
Atrial-sensed ventricular-paced rhythm
No further analysis attempted due to paced rhythm
Compared to ECG 2021 12:48:08
No significant changes
Electronically Signed On 2021 11:21:06 CST by Micah Mota
[email or web address]
 
 
 
 
 
 
 
 
 
 
 
 
 
 
 
 
 
 
 
 
  <ELECTRONICALLY SIGNED>
                                           By: Micah Mota MD, FACC      
  21     1121
D: 21 1020   _____________________________________
T: 21 1020   Micah Mota MD, [facility]        /EPI

## 2021-12-24 NOTE — NUR
RECEIVED REPORT. ASSUMED CARE OF PT AROUND 730. AM ASSESSMENT AND VITALS
COMPLETED AS CHARTED. MEDS PER EMAR. TITRATED OFF O2 TO RA. DISCAHRGE ORDERS
RECEIVED. DISCHARGE COMPLETED AS DOCUMENTED. GET WITH THE GUIDELINES FOLLOW UP
APPPOINTMENT -- PT STATES HE HAS AN APPPOINTMENT SCHEDULED FOR NEXT WEEK WITH
HIS PCP ALREADY. PT TO SEE DR ECHOLS IN 1 MONTH. IV AND CARDIAC MONITOR
REMOVED. ALL BELONGINGS GATHERED AND LEAVING WITH PT. SCRIPTS GIVEN TO PT. PT
AWARE TO FILL THEM. PT LEFT UNIT IN WC WITH NURSING STAFF. PT LEFT HOSPITAL IN
CAR WITH WIFE.

## 2021-12-25 NOTE — CON
[address]  [address]                    CONSULTATION                  
_______________________________________________________________________________
 
Name:       MAR ALLEN              Room:           [address] [place]  
M.R.#:  [id number]      Account #:      [id number]  
Admission:  12/20/21     Attend Phys:    CAROLE Pickett
Discharge:  12/24/21     Date of Birth:  [birth year]  
         Report #: [id number]
                                                                     [id number]
_______________________________________________________________________________
THIS REPORT FOR:  
 
cc:  Griffin Sargent,Micah Barron MD [facility]                                            ~
 
 
 
cc:     Griffin Sargent DO
DATE OF CONSULTATION: 12/22/2021
 
CARDIOLOGY CONSULTATION
 
HISTORY OF PRESENT ILLNESS:  The patient is a 66-year-old white male who I was 
asked to see in the hospital after complaining of chest pain.  The patient has 
extensive and complicated past medical history.  He apparently had a heart 
attack more than 10 years ago and had a coronary stent placed here at [facility].  He was found to have evidence of ischemic cardiomyopathy and 
subsequently, he had a defibrillator inserted by Dr. Huerta.  He had a generator
change about a year ago.  He has never had a discharge of his defibrillator, 
which he checks in the home with a transmitter.  He notes that several years 
ago, he had chest pain and was admitted to the hospital in [place], [place], and 
had 3-vessel bypass surgery performed.  He is not very active at this time.  
However, he notes for the past week, he has been having intermittent chest pain 
when he walks any amount of distance.  He feels the heaviness, becomes short of 
breath.  It is relieved with rest.  He denied pain radiating down his arms.  It 
is not related to fever, coughing.  He has had no bleeding.  No trauma to his 
chest.  He does get short of breath at exertion, but has had no edema.  Denied 
any palpitations or syncope.
 
PAST MEDICAL HISTORY:  He has had back surgery, knee surgery, diabetes, 
hypertension, hyperlipidemia. 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
 
MEDICATIONS:  Consist of an aspirin a day, Plavix, digoxin, Lasix, insulin, 
metoprolol, Entresto, simvastatin, spironolactone.  He is on warfarin.
 
ALLERGIES:  HE HAS A PREVIOUS INTOLERANCE TO LIPITOR AND CRESTOR.
 
FAMILY HISTORY:  His father had heart problem.
 
SOCIAL HISTORY:  He is .  He and his wife live in [place], [place];
retired from [employer].  No smoking or alcohol abuse.
 
REVIEW OF SYSTEMS:  No history of asthma, liver disease, kidney disease, cancer,
psychiatric illness.
 
 
 
 
[place] [address]                    CONSULTATION                  
_______________________________________________________________________________
 
Name:       MAR ALLEN              Room:           [address]#:  [id number]      Account #:      [id number]  
Admission:  12/20/21     Attend Phys:    CAROLE Pickett
Discharge:  12/24/21     Date of Birth:  [birth year]  
         Report #: [id number]
                                                                     [id number]
_______________________________________________________________________________
 
 
PHYSICAL EXAMINATION:
GENERAL:  Revealed an elderly male, appeared in no distress.
VITAL SIGNS:  He had a blood pressure of 140/90, pulse 70, he is afebrile.
HEENT:  He was anicteric.  Conjunctivae pink.  Mucosa moist.
NECK:  Veins do not appear distended.  No carotid bruits.  Neck supple.
CHEST:  Clear to auscultation.
HEART:  Regular rate and rhythm, no murmur.
ABDOMEN:  Soft.
EXTREMITIES:  Had no edema.
SKIN:  Cool and dry.
NEUROLOGIC:  Nonfocal.
 
DIAGNOSTIC DATA:  His ECG on admission showed what appear to represent atrial 
flutter with a ventricular paced rhythm.  His x-rays on admission, the patient 
had a portable chest x-ray that showed cardiomegaly, normal pulmonary 
vasculature, small left effusion.  He actually had an ultrasound of the abdomen 
because of abdominal pain that showed normal gallbladder, no gallstones.  He 
actually had a CT scan of the head for confusion that showed no acute 
abnormality.
 
LABORATORY DATA:  His lab work, creatinine 1.0, his alkaline phosphatase is 196,
SGPT 67.  High-sensitivity troponin was 72.  BNP 3318.  His INR on admission 
1.8.  Hemoglobin 14.  His COVID antigen stat test was negative.
 
IMPRESSION AND RECOMMENDATIONS:
1.  Unstable angina.  Recommend cardiac catheterization.
2.  Diabetes.
3.  Hypertension.
4.  Cardiomyopathy.  The patient is on Entresto and a beta blocker.  The 
patient's last echocardiogram here at [facility] in 2019 showed an ejection 
fraction of 25%.
5.  Hyperlipidemia.  The patient is on a statin drug.
6.  Previous stroke.
7.  Atrial flutter.  The patient is on warfarin.
 
 
 
 
 
 
 
 
<ELECTRONICALLY SIGNED>
                                        By:  Micah Mota MD, FACC      
12/25/21     1410
D: 12/22/21 0835_______________________________________
T: 12/22/21 0940Davicaroel Mota MD, FACC         /nt

## 2022-01-13 ENCOUNTER — HOSPITAL ENCOUNTER (OUTPATIENT)
Dept: HOSPITAL 96 - M.CL | Age: 67
Discharge: HOME | End: 2022-01-13
Attending: INTERNAL MEDICINE
Payer: MEDICARE

## 2022-01-13 VITALS — SYSTOLIC BLOOD PRESSURE: 111 MMHG | DIASTOLIC BLOOD PRESSURE: 71 MMHG

## 2022-01-13 DIAGNOSIS — Z79.01: ICD-10-CM

## 2022-01-13 DIAGNOSIS — Z98.890: ICD-10-CM

## 2022-01-13 DIAGNOSIS — E78.5: ICD-10-CM

## 2022-01-13 DIAGNOSIS — I10: ICD-10-CM

## 2022-01-13 DIAGNOSIS — Z88.8: ICD-10-CM

## 2022-01-13 DIAGNOSIS — E11.9: ICD-10-CM

## 2022-01-13 DIAGNOSIS — I48.0: Primary | ICD-10-CM

## 2022-01-13 DIAGNOSIS — I50.22: ICD-10-CM

## 2022-01-13 DIAGNOSIS — Z86.73: ICD-10-CM

## 2022-01-13 DIAGNOSIS — Z79.899: ICD-10-CM

## 2022-01-13 LAB
ALBUMIN SERPL-MCNC: 3.5 G/DL (ref 3.4–5)
ALP SERPL-CCNC: 119 U/L (ref 46–116)
ALT SERPL-CCNC: 105 U/L (ref 30–65)
ANION GAP SERPL CALC-SCNC: 10 MMOL/L (ref 7–16)
APTT BLD: 40.4 SECONDS (ref 25–31.3)
AST SERPL-CCNC: 73 U/L (ref 15–37)
BILIRUB SERPL-MCNC: 0.4 MG/DL
BUN SERPL-MCNC: 34 MG/DL (ref 7–18)
CALCIUM SERPL-MCNC: 9 MG/DL (ref 8.5–10.1)
CHLORIDE SERPL-SCNC: 96 MMOL/L (ref 98–107)
CO2 SERPL-SCNC: 28 MMOL/L (ref 21–32)
CREAT SERPL-MCNC: 1.5 MG/DL (ref 0.6–1.3)
GLUCOSE SERPL-MCNC: 334 MG/DL (ref 70–99)
HCT VFR BLD CALC: 47.6 % (ref 42–52)
HGB BLD-MCNC: 15.3 GM/DL (ref 14–18)
INR PPP: 2.7
MCH RBC QN AUTO: 25.7 PG (ref 26–34)
MCHC RBC AUTO-ENTMCNC: 32.2 G/DL (ref 28–37)
MCV RBC: 80 FL (ref 80–100)
MPV: 8.6 FL. (ref 7.2–11.1)
PLATELET COUNT*: 214 THOU/UL (ref 150–400)
POTASSIUM SERPL-SCNC: 4.7 MMOL/L (ref 3.5–5.1)
PROT SERPL-MCNC: 7.6 G/DL (ref 6.4–8.2)
PROTHROMBIN TIME: 26.5 SECONDS (ref 9.2–11.5)
RBC # BLD AUTO: 5.95 MIL/UL (ref 4.5–6)
RDW-CV: 17.5 % (ref 10.5–14.5)
SODIUM SERPL-SCNC: 134 MMOL/L (ref 136–145)
WBC # BLD AUTO: 8.2 THOU/UL (ref 4–11)

## 2022-01-13 NOTE — EKG
[place] [address]
Phone:  [phone]                     ELECTROCARDIOGRAM REPORT      
_______________________________________________________________________________
 
Name:         MAR ALLEN             Room:                     [facility]#:    [id number]     Account #:     [id number]  
Admission:    22    Attend Phys:   Conner Huerta,
Discharge:                Date of Birth: [birth year]  
Date of Service: 22 1014  Report #:      [id number]
        [id number]
_______________________________________________________________________________
THIS REPORT FOR:  //name//                      
 
                          [facility]
                                       
Test Date:    2022               Test Time:    10:14:20
Pat Name:     MAR ALLEN            Department:   
Patient ID:   [id number]            Room:          
Gender:                               Technician:   
:          1955               Requested By: Conner Huerta
Order Number: [id number]    Reading MD:   Gibson Downey
                                 Measurements
Intervals                              Axis          
Rate:         70                       P:            -71
IL:           235                      QRS:          -90
QRSD:         215                      T:            106
QT:           521                                    
QTc:          563                                    
                           Interpretive Statements
Ventricular-paced rhythm
No further analysis attempted due to paced rhythm
Compared to ECG 2022 09:44:59
AV dual-paced complex(es) or rhythm no longer present
Electronically Signed On 2022 10:35:53 CST by Gibson Downey
[email or web address]
 
 
 
 
 
 
 
 
 
 
 
 
 
 
 
 
 
 
 
 
  <ELECTRONICALLY SIGNED>
                                           By: Gibson Downey MD, [facility]     
  22     1035
D: 22 1014   _____________________________________
T: 22 1014   Gibson Downey MD, [facility]       /EPI

## 2022-01-13 NOTE — EKG
[place] [address]
Phone:  [phone]                     ELECTROCARDIOGRAM REPORT      
_______________________________________________________________________________
 
Name:         MAR ALLEN             Room:                     [facility]#:    [id number]     Account #:     [id number]  
Admission:    22    Attend Phys:   Conner Huerta,
Discharge:                Date of Birth: [birth year]  
Date of Service: 22 0944  Report #:      [id number]
        [id number]
_______________________________________________________________________________
THIS REPORT FOR:  //name//                      
 
                          [facility]
                                       
Test Date:    2022               Test Time:    09:44:59
Pat Name:     MAR ALLEN            Department:   
Patient ID:   [id number]            Room:          
Gender:                               Technician:   
:          1955               Requested By: Conner Huerta
Order Number: [id number]    Reading MD:   Gibson Downey
                                 Measurements
Intervals                              Axis          
Rate:         71                       P:            0
SC:           148                      QRS:          216
QRSD:         158                      T:            51
QT:           467                                    
QTc:          508                                    
                           Interpretive Statements
Atrial-ventricular dual-paced rhythm
No further analysis attempted due to paced rhythm
Baseline wander in lead(s) I,V4,V6
Compared to ECG 2021 10:20:30
Atrial-sensed ventricular-paced complex(es) or rhythm no longer present
Electronically Signed On 2022 10:35:05 CST by Gibson Downey
[email or web address]
 
 
 
 
 
 
 
 
 
 
 
 
 
 
 
 
 
 
 
  <ELECTRONICALLY SIGNED>
                                           By: Gibson Downey MD, FAC     
  22     1035
D: 22 0944   _____________________________________
T: 22 0944   Gibson Downey MD, FAC       /EPI

## 2022-01-14 NOTE — CARD
[address]  [address]                    CARDIAC CATH REPORT           
_______________________________________________________________________________
 
Name:       MAR ALLEN              Room:                      [facility].#:  [id number]      Account #:      [id number]  
Admission:  01/13/22     Attend Phys:    Conner Huerta MD
Discharge:               Date of Birth:  [birth year]  
         Report #: [id number]
                                                                     [id number]
_______________________________________________________________________________
THIS REPORT FOR:  
 
cc:  Griffin Sargent,Conner Herrera MD [facility]                                         ~
 
DATE OF SERVICE: 01/13/2022
 
CARDIAC PROCEDURE
 
PROCEDURE:  Transesophageal-guided cardioversion.
 
DESCRIPTION OF PROCEDURE:  After informed consent was obtained, the patient was 
brought to the cardiac holding area.  Initial telemetry and EKG showed the 
patient to be in a sinus rhythm.  Pacemaker was interrogated to verify this.  
Transesophageal-guided cardioversion was therefore not undertaken.  The patient 
was discharged uneventfully.
 
IMPRESSION:
1.  History of paroxysmal atrial fibrillation with spontaneous conversion to 
normal sinus rhythm.
2.  The patient discharged to home uneventfully without need for cardioversion.
 
 
 
 
 
 
 
 
 
 
 
 
 
 
 
 
 
 
 
 
 
 
<ELECTRONICALLY SIGNED>
                                        By:  Conner Huerta MD, FACC   
01/14/22     1758
D: 01/13/22 1639_______________________________________
T: 01/13/22 2021[facility] Huerta MD, FACC      /nt